# Patient Record
Sex: FEMALE | Race: WHITE | NOT HISPANIC OR LATINO | URBAN - METROPOLITAN AREA
[De-identification: names, ages, dates, MRNs, and addresses within clinical notes are randomized per-mention and may not be internally consistent; named-entity substitution may affect disease eponyms.]

---

## 2017-07-18 ENCOUNTER — APPOINTMENT (RX ONLY)
Dept: URBAN - METROPOLITAN AREA CLINIC 349 | Facility: CLINIC | Age: 52
Setting detail: DERMATOLOGY
End: 2017-07-18

## 2017-07-18 DIAGNOSIS — L81.4 OTHER MELANIN HYPERPIGMENTATION: ICD-10-CM

## 2017-07-18 DIAGNOSIS — D18.0 HEMANGIOMA: ICD-10-CM

## 2017-07-18 DIAGNOSIS — L70.0 ACNE VULGARIS: ICD-10-CM

## 2017-07-18 DIAGNOSIS — L82.1 OTHER SEBORRHEIC KERATOSIS: ICD-10-CM

## 2017-07-18 DIAGNOSIS — L98.8 OTHER SPECIFIED DISORDERS OF THE SKIN AND SUBCUTANEOUS TISSUE: ICD-10-CM

## 2017-07-18 DIAGNOSIS — D22 MELANOCYTIC NEVI: ICD-10-CM

## 2017-07-18 PROBLEM — D18.01 HEMANGIOMA OF SKIN AND SUBCUTANEOUS TISSUE: Status: ACTIVE | Noted: 2017-07-18

## 2017-07-18 PROBLEM — F32.9 MAJOR DEPRESSIVE DISORDER, SINGLE EPISODE, UNSPECIFIED: Status: ACTIVE | Noted: 2017-07-18

## 2017-07-18 PROBLEM — D22.5 MELANOCYTIC NEVI OF TRUNK: Status: ACTIVE | Noted: 2017-07-18

## 2017-07-18 PROBLEM — M12.9 ARTHROPATHY, UNSPECIFIED: Status: ACTIVE | Noted: 2017-07-18

## 2017-07-18 PROCEDURE — 99203 OFFICE O/P NEW LOW 30 MIN: CPT

## 2017-07-18 PROCEDURE — ? PRESCRIPTION

## 2017-07-18 PROCEDURE — ? COUNSELING

## 2017-07-18 PROCEDURE — ? RECOMMENDATIONS

## 2017-07-18 RX ORDER — DAPSONE 75 MG/G
GEL TOPICAL
Qty: 1 | Refills: 3 | Status: ERX | COMMUNITY
Start: 2017-07-18

## 2017-07-18 RX ORDER — SODIUM SULFACETAMIDE AND SULFUR 80; 40 MG/ML; MG/ML
LOTION TOPICAL
Qty: 1 | Refills: 4 | Status: ERX | COMMUNITY
Start: 2017-07-18

## 2017-07-18 RX ADMIN — DAPSONE: 75 GEL TOPICAL at 13:45

## 2017-07-18 RX ADMIN — SODIUM SULFACETAMIDE AND SULFUR: 80; 40 LOTION TOPICAL at 13:46

## 2017-07-18 ASSESSMENT — LOCATION DETAILED DESCRIPTION DERM
LOCATION DETAILED: INFERIOR THORACIC SPINE
LOCATION DETAILED: RIGHT INFERIOR CENTRAL MALAR CHEEK
LOCATION DETAILED: MIDDLE STERNUM
LOCATION DETAILED: RIGHT MEDIAL FRONTAL SCALP
LOCATION DETAILED: LEFT INFERIOR CENTRAL MALAR CHEEK
LOCATION DETAILED: LEFT MID-UPPER BACK
LOCATION DETAILED: LOWER STERNUM
LOCATION DETAILED: RIGHT MEDIAL SUPERIOR CHEST
LOCATION DETAILED: UPPER STERNUM
LOCATION DETAILED: RIGHT SUPERIOR MEDIAL UPPER BACK
LOCATION DETAILED: RIGHT LOWER CUTANEOUS LIP
LOCATION DETAILED: RIGHT SUPERIOR PARIETAL SCALP

## 2017-07-18 ASSESSMENT — LOCATION SIMPLE DESCRIPTION DERM
LOCATION SIMPLE: SCALP
LOCATION SIMPLE: RIGHT UPPER BACK
LOCATION SIMPLE: CHEST
LOCATION SIMPLE: LEFT UPPER BACK
LOCATION SIMPLE: RIGHT SCALP
LOCATION SIMPLE: RIGHT LIP
LOCATION SIMPLE: LEFT CHEEK
LOCATION SIMPLE: RIGHT CHEEK
LOCATION SIMPLE: UPPER BACK

## 2017-07-18 ASSESSMENT — LOCATION ZONE DERM
LOCATION ZONE: LIP
LOCATION ZONE: FACE
LOCATION ZONE: TRUNK
LOCATION ZONE: SCALP

## 2017-07-18 NOTE — HPI: SKIN LESIONS
How Severe Is Your Skin Lesion?: moderate
Have Your Skin Lesions Been Treated?: not been treated
Is This A New Presentation, Or A Follow-Up?: Skin Lesions
Additional History: Patient is particularly concerned about two lesions on her scalp, one has been present for 5 years and is growing and the other has been present for 6 months.

## 2017-07-18 NOTE — PROCEDURE: RECOMMENDATIONS
Recommendations (Free Text): Sunscreen
Recommendations (Free Text): Aczone 7.5% to a clean dry face nightly \\nSunscreen
Detail Level: Zone

## 2017-09-26 ENCOUNTER — APPOINTMENT (RX ONLY)
Dept: URBAN - METROPOLITAN AREA CLINIC 349 | Facility: CLINIC | Age: 52
Setting detail: DERMATOLOGY
End: 2017-09-26

## 2017-09-26 DIAGNOSIS — L70.0 ACNE VULGARIS: ICD-10-CM | Status: UNCHANGED

## 2017-09-26 PROBLEM — F41.9 ANXIETY DISORDER, UNSPECIFIED: Status: ACTIVE | Noted: 2017-09-26

## 2017-09-26 PROBLEM — K21.9 GASTRO-ESOPHAGEAL REFLUX DISEASE WITHOUT ESOPHAGITIS: Status: ACTIVE | Noted: 2017-09-26

## 2017-09-26 PROCEDURE — ? COUNSELING

## 2017-09-26 PROCEDURE — ? RECOMMENDATIONS

## 2017-09-26 PROCEDURE — 99213 OFFICE O/P EST LOW 20 MIN: CPT

## 2017-09-26 PROCEDURE — ? PRESCRIPTION

## 2017-09-26 RX ORDER — MINOCYCLINE HYDROCHLORIDE 100 MG/1
CAPSULE ORAL
Qty: 30 | Refills: 1 | Status: ERX | COMMUNITY
Start: 2017-09-26

## 2017-09-26 RX ORDER — SODIUM SULFACETAMIDE AND SULFUR 80; 40 MG/ML; MG/ML
LOTION TOPICAL
Qty: 1 | Refills: 3 | Status: ERX

## 2017-09-26 RX ADMIN — MINOCYCLINE HYDROCHLORIDE: 100 CAPSULE ORAL at 19:33

## 2017-09-26 ASSESSMENT — LOCATION SIMPLE DESCRIPTION DERM
LOCATION SIMPLE: LEFT CHEEK
LOCATION SIMPLE: RIGHT LIP
LOCATION SIMPLE: RIGHT CHEEK

## 2017-09-26 ASSESSMENT — LOCATION ZONE DERM
LOCATION ZONE: FACE
LOCATION ZONE: LIP

## 2017-09-26 ASSESSMENT — LOCATION DETAILED DESCRIPTION DERM
LOCATION DETAILED: RIGHT INFERIOR CENTRAL MALAR CHEEK
LOCATION DETAILED: RIGHT LOWER CUTANEOUS LIP
LOCATION DETAILED: LEFT INFERIOR CENTRAL MALAR CHEEK

## 2017-09-26 NOTE — PROCEDURE: RECOMMENDATIONS
Detail Level: Zone
Recommendations (Free Text): Patient states did not get sulfacleanse and did not call, info given for genrx, told to start and wash face twice daily \\nContinue Aczone 7.5% to a clean dry face nightly \\nMinocycline 100 mg once daily for 8 weeks \\nStressed not to pick

## 2017-10-18 ENCOUNTER — ANESTHESIA EVENT (OUTPATIENT)
Dept: SURGERY | Age: 52
End: 2017-10-18
Payer: COMMERCIAL

## 2017-10-19 ENCOUNTER — ANESTHESIA (OUTPATIENT)
Dept: SURGERY | Age: 52
End: 2017-10-19
Payer: COMMERCIAL

## 2017-10-19 ENCOUNTER — HOSPITAL ENCOUNTER (OUTPATIENT)
Age: 52
Setting detail: OUTPATIENT SURGERY
Discharge: HOME | End: 2017-10-19
Attending: ORTHOPAEDIC SURGERY | Admitting: ORTHOPAEDIC SURGERY
Payer: COMMERCIAL

## 2017-10-19 VITALS
HEART RATE: 84 BPM | SYSTOLIC BLOOD PRESSURE: 144 MMHG | BODY MASS INDEX: 27.44 KG/M2 | WEIGHT: 150 LBS | RESPIRATION RATE: 16 BRPM | DIASTOLIC BLOOD PRESSURE: 78 MMHG | OXYGEN SATURATION: 95 % | TEMPERATURE: 97.9 F

## 2017-10-19 PROCEDURE — 76210000021 HC REC RM PH II 0.5 TO 1 HR: Performed by: ORTHOPAEDIC SURGERY

## 2017-10-19 PROCEDURE — 76010000154 HC OR TIME FIRST 0.5 HR: Performed by: ORTHOPAEDIC SURGERY

## 2017-10-19 PROCEDURE — 77030002986 HC SUT PROL J&J -A: Performed by: ORTHOPAEDIC SURGERY

## 2017-10-19 PROCEDURE — 77030000032 HC CUF TRNQT ZIMM -B: Performed by: ORTHOPAEDIC SURGERY

## 2017-10-19 PROCEDURE — 74011250636 HC RX REV CODE- 250/636: Performed by: ANESTHESIOLOGY

## 2017-10-19 PROCEDURE — 74011250636 HC RX REV CODE- 250/636

## 2017-10-19 PROCEDURE — 74011000250 HC RX REV CODE- 250

## 2017-10-19 PROCEDURE — 74011000250 HC RX REV CODE- 250: Performed by: ORTHOPAEDIC SURGERY

## 2017-10-19 PROCEDURE — 74011250636 HC RX REV CODE- 250/636: Performed by: ORTHOPAEDIC SURGERY

## 2017-10-19 PROCEDURE — 76060000031 HC ANESTHESIA FIRST 0.5 HR: Performed by: ORTHOPAEDIC SURGERY

## 2017-10-19 PROCEDURE — 77030011884 HC TAPE CST PLSTR BSNM -A: Performed by: ORTHOPAEDIC SURGERY

## 2017-10-19 PROCEDURE — 77030010507 HC ADH SKN DERMBND J&J -B: Performed by: ORTHOPAEDIC SURGERY

## 2017-10-19 PROCEDURE — 77030006603 HC BLD CRPL ENDOSC S&N -B: Performed by: ORTHOPAEDIC SURGERY

## 2017-10-19 PROCEDURE — 77030018836 HC SOL IRR NACL ICUM -A: Performed by: ORTHOPAEDIC SURGERY

## 2017-10-19 RX ORDER — HYDROMORPHONE HYDROCHLORIDE 2 MG/ML
0.5 INJECTION, SOLUTION INTRAMUSCULAR; INTRAVENOUS; SUBCUTANEOUS
Status: DISCONTINUED | OUTPATIENT
Start: 2017-10-19 | End: 2017-10-19 | Stop reason: HOSPADM

## 2017-10-19 RX ORDER — MIDAZOLAM HYDROCHLORIDE 1 MG/ML
2 INJECTION, SOLUTION INTRAMUSCULAR; INTRAVENOUS
Status: DISCONTINUED | OUTPATIENT
Start: 2017-10-19 | End: 2017-10-19 | Stop reason: HOSPADM

## 2017-10-19 RX ORDER — LIDOCAINE HYDROCHLORIDE 10 MG/ML
INJECTION INFILTRATION; PERINEURAL AS NEEDED
Status: DISCONTINUED | OUTPATIENT
Start: 2017-10-19 | End: 2017-10-19 | Stop reason: HOSPADM

## 2017-10-19 RX ORDER — MIDAZOLAM HYDROCHLORIDE 1 MG/ML
2 INJECTION, SOLUTION INTRAMUSCULAR; INTRAVENOUS ONCE
Status: COMPLETED | OUTPATIENT
Start: 2017-10-19 | End: 2017-10-19

## 2017-10-19 RX ORDER — OXYCODONE HYDROCHLORIDE 5 MG/1
5 TABLET ORAL
Status: DISCONTINUED | OUTPATIENT
Start: 2017-10-19 | End: 2017-10-19 | Stop reason: HOSPADM

## 2017-10-19 RX ORDER — FENTANYL CITRATE 50 UG/ML
100 INJECTION, SOLUTION INTRAMUSCULAR; INTRAVENOUS ONCE
Status: DISCONTINUED | OUTPATIENT
Start: 2017-10-19 | End: 2017-10-19 | Stop reason: HOSPADM

## 2017-10-19 RX ORDER — CEFAZOLIN SODIUM IN 0.9 % NACL 2 G/50 ML
2 INTRAVENOUS SOLUTION, PIGGYBACK (ML) INTRAVENOUS ONCE
Status: COMPLETED | OUTPATIENT
Start: 2017-10-19 | End: 2017-10-19

## 2017-10-19 RX ORDER — LIDOCAINE HYDROCHLORIDE 10 MG/ML
0.1 INJECTION INFILTRATION; PERINEURAL AS NEEDED
Status: DISCONTINUED | OUTPATIENT
Start: 2017-10-19 | End: 2017-10-19 | Stop reason: HOSPADM

## 2017-10-19 RX ORDER — SODIUM CHLORIDE, SODIUM LACTATE, POTASSIUM CHLORIDE, CALCIUM CHLORIDE 600; 310; 30; 20 MG/100ML; MG/100ML; MG/100ML; MG/100ML
100 INJECTION, SOLUTION INTRAVENOUS CONTINUOUS
Status: DISCONTINUED | OUTPATIENT
Start: 2017-10-19 | End: 2017-10-19 | Stop reason: HOSPADM

## 2017-10-19 RX ORDER — FENTANYL CITRATE 50 UG/ML
INJECTION, SOLUTION INTRAMUSCULAR; INTRAVENOUS AS NEEDED
Status: DISCONTINUED | OUTPATIENT
Start: 2017-10-19 | End: 2017-10-19 | Stop reason: HOSPADM

## 2017-10-19 RX ORDER — PROPOFOL 10 MG/ML
INJECTION, EMULSION INTRAVENOUS AS NEEDED
Status: DISCONTINUED | OUTPATIENT
Start: 2017-10-19 | End: 2017-10-19 | Stop reason: HOSPADM

## 2017-10-19 RX ORDER — PROPOFOL 10 MG/ML
INJECTION, EMULSION INTRAVENOUS
Status: DISCONTINUED | OUTPATIENT
Start: 2017-10-19 | End: 2017-10-19 | Stop reason: HOSPADM

## 2017-10-19 RX ORDER — BUPIVACAINE HYDROCHLORIDE 5 MG/ML
INJECTION, SOLUTION EPIDURAL; INTRACAUDAL AS NEEDED
Status: DISCONTINUED | OUTPATIENT
Start: 2017-10-19 | End: 2017-10-19 | Stop reason: HOSPADM

## 2017-10-19 RX ORDER — LIDOCAINE HYDROCHLORIDE 20 MG/ML
INJECTION, SOLUTION EPIDURAL; INFILTRATION; INTRACAUDAL; PERINEURAL AS NEEDED
Status: DISCONTINUED | OUTPATIENT
Start: 2017-10-19 | End: 2017-10-19 | Stop reason: HOSPADM

## 2017-10-19 RX ADMIN — SODIUM CHLORIDE, SODIUM LACTATE, POTASSIUM CHLORIDE, AND CALCIUM CHLORIDE 100 ML/HR: 600; 310; 30; 20 INJECTION, SOLUTION INTRAVENOUS at 08:00

## 2017-10-19 RX ADMIN — PROPOFOL 75 MCG/KG/MIN: 10 INJECTION, EMULSION INTRAVENOUS at 08:42

## 2017-10-19 RX ADMIN — CEFAZOLIN 2 G: 1 INJECTION, POWDER, FOR SOLUTION INTRAMUSCULAR; INTRAVENOUS; PARENTERAL at 08:40

## 2017-10-19 RX ADMIN — PROPOFOL 80 MG: 10 INJECTION, EMULSION INTRAVENOUS at 08:42

## 2017-10-19 RX ADMIN — MIDAZOLAM HYDROCHLORIDE 2 MG: 1 INJECTION, SOLUTION INTRAMUSCULAR; INTRAVENOUS at 07:30

## 2017-10-19 RX ADMIN — LIDOCAINE HYDROCHLORIDE 20 MG: 20 INJECTION, SOLUTION EPIDURAL; INFILTRATION; INTRACAUDAL; PERINEURAL at 08:40

## 2017-10-19 RX ADMIN — FENTANYL CITRATE 25 MCG: 50 INJECTION, SOLUTION INTRAMUSCULAR; INTRAVENOUS at 08:50

## 2017-10-19 RX ADMIN — FENTANYL CITRATE 50 MCG: 50 INJECTION, SOLUTION INTRAMUSCULAR; INTRAVENOUS at 08:38

## 2017-10-19 RX ADMIN — FENTANYL CITRATE 25 MCG: 50 INJECTION, SOLUTION INTRAMUSCULAR; INTRAVENOUS at 08:45

## 2017-10-19 NOTE — OP NOTES
Carpal Tunnel Release Operative Report         Preoperative diagnosis:  Carpal tunnel syndrome, left [G56.02]    Postoperative diagnosis: LEFT CARPAL TUNNEL SYNDROME    Surgeon(s) and Role:     * Kelsea Lassiter MD - Primary     Anesthesia: MAC Local with MAC. Procedures: Procedure(s):  HAND CARPAL TUNNEL RELEASE ENDOSCOPIC LEFT     EBL/IV FLUIDS: Per Anesthesia. COMPLICATIONS: None. DISPOSITION: Stable to recovery room. INDICATIONS FOR PROCEDURE: The patient is a pleasant 54-year-old female with history of left carpal tunnel syndrome that has failed nonoperative measures. It was confirmed on preoperative nerve studies. After both operative and nonoperative treatment options were discussed, the decision was made to go ahead with a left endoscopic carpal tunnel release. Risks and benefits of the procedure were discussed including, but not limited to bleeding, infection, injury to adjacent structures consisting of tendon, artery, and nerve, need for additional procedures, wound dehiscence, scar formation, incomplete resolution of symptoms, recurrence of symptoms, and transient neuropraxia. Informed consent was obtained. PROCEDURE IN DETAIL: The patient was seen and marked in the preoperative suite. The patient was taken back to the OR, placed on the table in supine position with left upper extremities on hand tables. Left upper extremities were prepped and draped in standard sterile fashion. A formal timeout was performed confirming patient identification, preoperative antibiotics, and planned operative procedure. We infiltrated both planned incision sites with lidocaine and Marcaine, exsanguinated the left upper extremity and the tourniquet was placed up to 250 mmHg. A standard proximal incision was made just proximal to the distal palmar crease and ulnar to palmaris longus. Dissection was performed bluntly with Ragnell retractors.   The antebrachial fascia was identified and incised longitudinally. The carpal tunnel was entered with a spatula, staying ulnar throughout the procedure just radial to the hook of hamate. The undersurface of the trasverse carpal ligament was carefully scraped to remove adhesions. We placed the trocar in the same manner, ulnarly, made our distal incision and fully seated the trochar. We were able to see the entire transverse carpal ligament without difficulty. Under direct vision and in a proximal and distal manner, we incised the transverse carpal ligament ulnarly. We saw adequate retraction of leaflets and distal fat confirming complete release. Instruments were removed. We irrigated copiously with normal saline. The proximal incisionwas closed with Prolene and Dermabond glue. The distal incision was closed with Dermabond glue. The tourniquet was let down, the fingers had brisk capillary refill and a soft sterile dressing was placed. POSTOPERATIVE CARE: Early motion. No heavy lifting.  Followup in 2 weeks for suture removal.    Closure: Primary    Complications: None     Signed By: Burnard Pallas, MD

## 2017-10-19 NOTE — ANESTHESIA POSTPROCEDURE EVALUATION
Post-Anesthesia Evaluation and Assessment    Patient: Aggie Roman MRN: [id number]  SSN: [id number]    YOB: 1965  Age: 47 y.o. Sex: female       Cardiovascular Function/Vital Signs  Visit Vitals    /78    Pulse 84    Temp 36.6 °C (97.9 °F)    Resp 16    Wt 68 kg (150 lb)    SpO2 92%    BMI 27.44 kg/m2     Bedside sat 95%. Patient is status post total IV anesthesia anesthesia for Procedure(s):  HAND CARPAL TUNNEL RELEASE ENDOSCOPIC LEFT. Nausea/Vomiting: None    Postoperative hydration reviewed and adequate. Pain:      Managed    Neurological Status:   Neuro (WDL): Within Defined Limits (10/19/17 0724)   At baseline    Mental Status and Level of Consciousness: Awake. Pulmonary Status:   O2 Device: Room air (10/19/17 0900)   Adequate oxygenation and airway patent    Complications related to anesthesia: None    Post-anesthesia assessment completed.  No concerns    Signed By: Sarahy Burnett MD     October 19, 2017

## 2017-10-19 NOTE — IP AVS SNAPSHOT
[address] [facility] 
 
 
 [address] [facility] [address] [facility] 
[phone] Patient: Capri Torres MRN: [id number] :1965 You are allergic to the following No active allergies Recent Documentation Weight BMI OB Status Smoking Status 68 kg 27.44 kg/m2 Hysterectomy Former Smoker Emergency Contacts Name Discharge Info Relation Home Work Mobile Victor Hugo Tenorio DISCHARGE CAREGIVER [3] Spouse [3] [phone] About your hospitalization You were admitted on:  2017 You last received care in the:  [facility] OP PACU You were discharged on:  2017 Unit phone number:  [phone] Why you were hospitalized Your primary diagnosis was:  Not on File Providers Seen During Your Hospitalizations Provider Role Specialty Primary office phone Jacqueline Gonsales MD Attending Provider Orthopedic Surgery [phone] Your Primary Care Physician (PCP) Primary Care Physician Office Phone Office Fax [facility], [address] [place] [address] [phone] Follow-up Information Follow up With Details Comments Contact Info Melo Abdalla MD   [address] [facility] [address],[address] 
[phone] Your Appointments   2:30 PM EDT Office Visit with Melo Abdalla MD  
[address]) [address] [address] [phone] Current Discharge Medication List  
  
CONTINUE these medications which have CHANGED Dose & Instructions Dispensing Information Comments Morning Noon Evening Bedtime  
 busPIRone 15 mg tablet Commonly known as:  BUSPAR What changed:   
- how much to take - when to take this 
- additional instructions Your last dose was: Your next dose is: TAKE 2 TABLETS DAILY Quantity:  60 Tab Refills:  0 DULoxetine 60 mg capsule Commonly known as:  CYMBALTA What changed:   
- how much to take - when to take this 
- additional instructions Your last dose was: Your next dose is: TAKE ONE CAPSULE DAILY Quantity:  30 Cap Refills:  0 CONTINUE these medications which have NOT CHANGED Dose & Instructions Dispensing Information Comments Morning Noon Evening Bedtime B COMPLEX 1 tablet Generic drug:  b complex vitamins Your last dose was: Your next dose is:    
   
   
 Dose:  1 Tab Take 1 Tab by mouth daily. Refills:  0 LORazepam 1 mg tablet Commonly known as:  ATIVAN Your last dose was: Your next dose is:    
   
   
 Dose:  1 mg Take 1 Tab by mouth two (2) times daily as needed for Anxiety. Quantity:  60 Tab Refills:  5  
     
   
   
   
  
 meloxicam 15 mg tablet Commonly known as:  MOBIC Your last dose was: Your next dose is:    
   
   
 Dose:  15 mg Take 1 Tab by mouth daily. Quantity:  90 Tab Refills:  0 Discharge Instructions Keep dressing clean, dry and intact until post op day number 2-3. Then may shower, pat dry and keep covered until follow up. Do not scrub incision or submerge under water. Move fingers, elevate, and ice to prevent swelling. No heavy lifting. ACTIVITY · As tolerated and as directed by your doctor. · Move fingers, elevate, and ice to prevent swelling. No heavy lifting. Wrap in plastic when bathing for 2 to 3 days. then may shower, pat dry and keep covered until follow up. Do not scrub incision or submerge under water DIET · Clear liquids until no nausea or vomiting; then light diet for the first day. · Advance to regular diet on second day, unless your doctor orders otherwise. · If nausea and vomiting continues, call your doctor. · Avoid greasy and spicy food today to reduce nausea. PAIN 
· Take pain medication as directed by your doctor. · Call your doctor if pain is NOT relieved by medication. · DO NOT take aspirin of blood thinners unless directed by your doctor. · Take pain pills with food to reduce nausea · Pain pills may cause constipation. May use stool softener DRESSING CARE Keep dressing clean, dry and intact until post op day number 2-3. Then may shower, pat dry and keep covered until follow up. Do not scrub incision or submerge under water. CALL YOUR DOCTOR IF  
· Excessive bleeding that does not stop after holding pressure over the area · Temperature of 101 degrees F or above · Excessive redness, swelling or bruising, and/ or green or yellow, smelly discharge from incision AFTER ANESTHESIA · For the first 24 hours: DO NOT Drive, Drink alcoholic beverages, or Make important decisions. · Be aware of dizziness following anesthesia and while taking pain medication. APPOINTMENT DATE/ TIME: November 1,2017 at 9:35 a.m. At [place] [address] office YOUR DOCTOR'S PHONE NUMBER [phone] DISCHARGE SUMMARY from Nurse PATIENT INSTRUCTIONS: 
 
After general anesthesia or intravenous sedation, for 24 hours or while taking prescription Narcotics: · Limit your activities · Do not drive and operate hazardous machinery · Do not make important personal or business decisions · Do  not drink alcoholic beverages · If you have not urinated within 8 hours after discharge, please contact your surgeon on call. *  Please give a list of your current medications to your Primary Care Provider. *  Please update this list whenever your medications are discontinued, doses are 
    changed, or new medications (including over-the-counter products) are added. *  Please carry medication information at all times in case of emergency situations. These are general instructions for a healthy lifestyle: No smoking/ No tobacco products/ Avoid exposure to second hand smoke Surgeon General's Warning:  Quitting smoking now greatly reduces serious risk to your health. Obesity, smoking, and sedentary lifestyle greatly increases your risk for illness A healthy diet, regular physical exercise & weight monitoring are important for maintaining a healthy lifestyle You may be retaining fluid if you have a history of heart failure or if you experience any of the following symptoms:  Weight gain of 3 pounds or more overnight or 5 pounds in a week, increased swelling in our hands or feet or shortness of breath while lying flat in bed. Please call your doctor as soon as you notice any of these symptoms; do not wait until your next office visit. Recognize signs and symptoms of STROKE: 
 
F-face looks uneven A-arms unable to move or move unevenly S-speech slurred or non-existent T-time-call 911 as soon as signs and symptoms begin-DO NOT go Back to bed or wait to see if you get better-TIME IS BRAIN. Discharge Orders None Introducing [facility]! Carmella Vaughan introduces Free[employer]M patient portal. Now you can access parts of your medical record, email your doctor's office, and request medication refills online. 1. In your internet browser, go to [email or web address] 2. Click on the First Time User? Click Here link in the Sign In box. You will see the New Member Sign Up page. 3. Enter your Free[employer]M Access Code exactly as it appears below. You will not need to use this code after youve completed the sign-up process. If you do not sign up before the expiration date, you must request a new code. · Free[employer] Access Code: [email or web address] Expires: 1/16/2018  1:09 PM 
 
4. Enter the last four digits of your Social Security Number (xxxx) and Date of Birth (mm/dd/yyyy) as indicated and click Submit. You will be taken to the next sign-up page. 5. Create a [employer] ID. This will be your [employer] login ID and cannot be changed, so think of one that is secure and easy to remember. 6. Create a [employer] password. You can change your password at any time. 7. Enter your Password Reset Question and Answer. This can be used at a later time if you forget your password. 8. Enter your e-mail address. You will receive e-mail notification when new information is available in [address] 9. Click Sign Up. You can now view and download portions of your medical record. 10. Click the Download Summary menu link to download a portable copy of your medical information. If you have questions, please visit the Frequently Asked Questions section of the [employer] website. Remember, [employer] is NOT to be used for urgent needs. For medical emergencies, dial 911. Now available from your iPhone and Android! General Information Please provide this summary of care documentation to your next provider. Patient Signature:  ____________________________________________________________ Date:  ____________________________________________________________  
  
Trina Holt Provider Signature:  ____________________________________________________________ Date:  ____________________________________________________________

## 2017-10-19 NOTE — DISCHARGE INSTRUCTIONS
Keep dressing clean, dry and intact until post op day number 2-3. Then may shower, pat dry and keep covered until follow up. Do not scrub incision or submerge under water. Move fingers, elevate, and ice to prevent swelling. No heavy lifting. ACTIVITY  · As tolerated and as directed by your doctor. · Move fingers, elevate, and ice to prevent swelling. No heavy lifting. Wrap in plastic when bathing for 2 to 3 days. then may shower, pat dry and keep covered until follow up. Do not scrub incision or submerge under water   DIET  · Clear liquids until no nausea or vomiting; then light diet for the first day. · Advance to regular diet on second day, unless your doctor orders otherwise. · If nausea and vomiting continues, call your doctor. · Avoid greasy and spicy food today to reduce nausea. PAIN  · Take pain medication as directed by your doctor. · Call your doctor if pain is NOT relieved by medication. · DO NOT take aspirin of blood thinners unless directed by your doctor. · Take pain pills with food to reduce nausea  · Pain pills may cause constipation. May use stool softener    DRESSING CARE Keep dressing clean, dry and intact until post op day number 2-3. Then may shower, pat dry and keep covered until follow up. Do not scrub incision or submerge under water. CALL YOUR DOCTOR IF   · Excessive bleeding that does not stop after holding pressure over the area  · Temperature of 101 degrees F or above  · Excessive redness, swelling or bruising, and/ or green or yellow, smelly discharge from incision    AFTER ANESTHESIA   · For the first 24 hours: DO NOT Drive, Drink alcoholic beverages, or Make important decisions. · Be aware of dizziness following anesthesia and while taking pain medication. APPOINTMENT DATE/ TIME: November 1,2017 at 9:35 a.m.  At [place] [address] office     Anamaria Velez DOCTOR'S PHONE NUMBER [phone]      DISCHARGE SUMMARY from Nurse    PATIENT INSTRUCTIONS:    After general anesthesia or intravenous sedation, for 24 hours or while taking prescription Narcotics:  · Limit your activities  · Do not drive and operate hazardous machinery  · Do not make important personal or business decisions  · Do  not drink alcoholic beverages  · If you have not urinated within 8 hours after discharge, please contact your surgeon on call. *  Please give a list of your current medications to your Primary Care Provider. *  Please update this list whenever your medications are discontinued, doses are      changed, or new medications (including over-the-counter products) are added. *  Please carry medication information at all times in case of emergency situations. These are general instructions for a healthy lifestyle:    No smoking/ No tobacco products/ Avoid exposure to second hand smoke    Surgeon General's Warning:  Quitting smoking now greatly reduces serious risk to your health. Obesity, smoking, and sedentary lifestyle greatly increases your risk for illness    A healthy diet, regular physical exercise & weight monitoring are important for maintaining a healthy lifestyle    You may be retaining fluid if you have a history of heart failure or if you experience any of the following symptoms:  Weight gain of 3 pounds or more overnight or 5 pounds in a week, increased swelling in our hands or feet or shortness of breath while lying flat in bed. Please call your doctor as soon as you notice any of these symptoms; do not wait until your next office visit. Recognize signs and symptoms of STROKE:    F-face looks uneven    A-arms unable to move or move unevenly    S-speech slurred or non-existent    T-time-call 911 as soon as signs and symptoms begin-DO NOT go       Back to bed or wait to see if you get better-TIME IS BRAIN.

## 2017-10-19 NOTE — IP AVS SNAPSHOT
[facility] 
 
 
 [address] [facility] [address] [facility] 
[phone] Patient: Abdulaziz Molina MRN: [id number] :1965 Current Discharge Medication List  
  
CONTINUE these medications which have CHANGED Dose & Instructions Dispensing Information Comments Morning Noon Evening Bedtime  
 busPIRone 15 mg tablet Commonly known as:  BUSPAR What changed:   
- how much to take - when to take this 
- additional instructions Your last dose was: Your next dose is: TAKE 2 TABLETS DAILY Quantity:  60 Tab Refills:  0 DULoxetine 60 mg capsule Commonly known as:  CYMBALTA What changed:   
- how much to take - when to take this 
- additional instructions Your last dose was: Your next dose is: TAKE ONE CAPSULE DAILY Quantity:  30 Cap Refills:  0 CONTINUE these medications which have NOT CHANGED Dose & Instructions Dispensing Information Comments Morning Noon Evening Bedtime B COMPLEX 1 tablet Generic drug:  b complex vitamins Your last dose was: Your next dose is:    
   
   
 Dose:  1 Tab Take 1 Tab by mouth daily. Refills:  0 LORazepam 1 mg tablet Commonly known as:  ATIVAN Your last dose was: Your next dose is:    
   
   
 Dose:  1 mg Take 1 Tab by mouth two (2) times daily as needed for Anxiety. Quantity:  60 Tab Refills:  5  
     
   
   
   
  
 meloxicam 15 mg tablet Commonly known as:  MOBIC Your last dose was: Your next dose is:    
   
   
 Dose:  15 mg Take 1 Tab by mouth daily. Quantity:  90 Tab Refills:  0

## 2017-10-19 NOTE — ANESTHESIA PREPROCEDURE EVALUATION
Anesthetic History   No history of anesthetic complications            Review of Systems / Medical History  Pertinent labs reviewed    Pulmonary          Smoker         Neuro/Psych         Psychiatric history     Cardiovascular  Within defined limits                Exercise tolerance: >4 METS     GI/Hepatic/Renal  Within defined limits              Endo/Other        Arthritis     Other Findings            Physical Exam    Airway  Mallampati: II  TM Distance: 4 - 6 cm  Neck ROM: normal range of motion   Mouth opening: Normal     Cardiovascular  Regular rate and rhythm,  S1 and S2 normal,  no murmur, click, rub, or gallop             Dental  No notable dental hx       Pulmonary  Breath sounds clear to auscultation               Abdominal  GI exam deferred       Other Findings            Anesthetic Plan    ASA: 2  Anesthesia type: total IV anesthesia          Induction: Intravenous  Anesthetic plan and risks discussed with: Patient and Spouse

## 2018-01-18 ENCOUNTER — HOSPITAL ENCOUNTER (OUTPATIENT)
Dept: MAMMOGRAPHY | Age: 53
Discharge: HOME | End: 2018-01-18
Attending: INTERNAL MEDICINE
Payer: COMMERCIAL

## 2018-01-18 DIAGNOSIS — Z12.39 SCREENING FOR BREAST CANCER: ICD-10-CM

## 2018-01-18 PROCEDURE — 77067 SCR MAMMO BI INCL CAD: CPT

## 2018-06-06 ENCOUNTER — HOSPITAL ENCOUNTER (OUTPATIENT)
Dept: SURGERY | Age: 53
Discharge: HOME | End: 2018-06-06

## 2018-06-06 VITALS — WEIGHT: 150 LBS | HEIGHT: 62 IN | BODY MASS INDEX: 27.6 KG/M2

## 2018-06-06 RX ORDER — OMEPRAZOLE 40 MG/1
40 CAPSULE, DELAYED RELEASE ORAL DAILY
COMMUNITY

## 2018-06-06 RX ORDER — DEXTROMETHORPHAN HYDROBROMIDE, GUAIFENESIN 5; 100 MG/5ML; MG/5ML
650 LIQUID ORAL AS NEEDED
COMMUNITY

## 2018-06-06 NOTE — PERIOP NOTES
Patient verified name, , and procedure. Type: 1a; abbreviated assessment per anesthesia guidelines  Labs per surgeon: none  Labs per anesthesia: none    Instructed pt that they will be notified via preop for time of arrival to GI lab. Follow diet and prep instructions per Dr Tracey Way instructions as follows: You will be on a clear liquid diet the day before your procedure and you may have any of the following clear liquids:   Water.  Strained fruit juices, without pulp, including apple, orange, white grape, or white cranberry.  Limeade or lemonade.  Coffee or tea (do not use any non-dairy creamer.)   Chicken broth.  Gelatin desserts, without added fruit or toppings (no red or purple gelatin.)  You should NOT:   Do NOT drink any milk products or use any milk products in coffee or tea.  Do NOT drink anything with red or purple dye.  Do NOT drink any alcoholic beverages. Bath or shower the night before and the am of surgery with non-moisturizing soap. No lotions, oils, powders, cologne on skin. No make up, eye make up or jewelry. Wear loose fitting comfortable, clean clothing. Must have adult present in building the entire time and MUST bring someone with you or arrange for someone to drive you home after the test.      You may take medications up to 3 hours prior to your procedure with sips of water only. Medications to take- tylenol, buspar, cymbalta, ativan, prilosec; patient to hold- mobic, vitamins. The following discharge instructions reviewed with patient: medication given during procedure may cause drowsiness for several hours, therefore, do not drive or operate machinery for remainder of the day, no alcohol on the day of your procedure, resume regular diet and activity unless otherwise directed, you may experience abdominal distention for several hours that is relieved by the passage of gas.  Contact your physician if you have any of the following: fever or chills, severe abdominal pain or excessive amount of bleeding or a large amount when having a bowel movement.  Occasional specks of blood with bowel movement would not be unusual.

## 2018-06-08 ENCOUNTER — HOSPITAL ENCOUNTER (OUTPATIENT)
Dept: CT IMAGING | Age: 53
Discharge: HOME | End: 2018-06-08
Attending: INTERNAL MEDICINE
Payer: SELF-PAY

## 2018-06-08 DIAGNOSIS — Z91.89 CARDIOVASCULAR RISK FACTOR: ICD-10-CM

## 2018-06-08 PROCEDURE — 75571 CT HRT W/O DYE W/CA TEST: CPT

## 2018-06-11 ENCOUNTER — ANESTHESIA EVENT (OUTPATIENT)
Dept: ENDOSCOPY | Age: 53
End: 2018-06-11
Payer: COMMERCIAL

## 2018-06-11 RX ORDER — SODIUM CHLORIDE 0.9 % (FLUSH) 0.9 %
5-10 SYRINGE (ML) INJECTION AS NEEDED
Status: CANCELLED | OUTPATIENT
Start: 2018-06-11

## 2018-06-11 RX ORDER — SODIUM CHLORIDE, SODIUM LACTATE, POTASSIUM CHLORIDE, CALCIUM CHLORIDE 600; 310; 30; 20 MG/100ML; MG/100ML; MG/100ML; MG/100ML
100 INJECTION, SOLUTION INTRAVENOUS CONTINUOUS
Status: CANCELLED | OUTPATIENT
Start: 2018-06-11

## 2018-06-11 NOTE — PROGRESS NOTES
Patient notified per Dr. Juliane Vargas that her Coronary calcium score is 5-congratulations. No concerns.

## 2018-06-12 ENCOUNTER — ANESTHESIA (OUTPATIENT)
Dept: ENDOSCOPY | Age: 53
End: 2018-06-12
Payer: COMMERCIAL

## 2018-06-12 ENCOUNTER — HOSPITAL ENCOUNTER (OUTPATIENT)
Age: 53
Setting detail: OUTPATIENT SURGERY
Discharge: HOME | End: 2018-06-12
Attending: SURGERY | Admitting: SURGERY
Payer: COMMERCIAL

## 2018-06-12 VITALS
WEIGHT: 154 LBS | OXYGEN SATURATION: 99 % | HEART RATE: 75 BPM | BODY MASS INDEX: 28.34 KG/M2 | RESPIRATION RATE: 12 BRPM | DIASTOLIC BLOOD PRESSURE: 78 MMHG | SYSTOLIC BLOOD PRESSURE: 122 MMHG | HEIGHT: 62 IN | TEMPERATURE: 98 F

## 2018-06-12 PROBLEM — Z80.0 FAMILY HISTORY OF COLON CANCER: Status: ACTIVE | Noted: 2018-06-12

## 2018-06-12 PROCEDURE — 74011250636 HC RX REV CODE- 250/636: Performed by: ANESTHESIOLOGY

## 2018-06-12 PROCEDURE — 76040000025: Performed by: SURGERY

## 2018-06-12 PROCEDURE — 74011250636 HC RX REV CODE- 250/636

## 2018-06-12 PROCEDURE — 76060000032 HC ANESTHESIA 0.5 TO 1 HR: Performed by: SURGERY

## 2018-06-12 PROCEDURE — 74011000250 HC RX REV CODE- 250

## 2018-06-12 RX ORDER — LIDOCAINE HYDROCHLORIDE 20 MG/ML
INJECTION, SOLUTION EPIDURAL; INFILTRATION; INTRACAUDAL; PERINEURAL AS NEEDED
Status: DISCONTINUED | OUTPATIENT
Start: 2018-06-12 | End: 2018-06-12 | Stop reason: HOSPADM

## 2018-06-12 RX ORDER — PROPOFOL 10 MG/ML
INJECTION, EMULSION INTRAVENOUS
Status: DISCONTINUED | OUTPATIENT
Start: 2018-06-12 | End: 2018-06-12 | Stop reason: HOSPADM

## 2018-06-12 RX ORDER — SODIUM CHLORIDE, SODIUM LACTATE, POTASSIUM CHLORIDE, CALCIUM CHLORIDE 600; 310; 30; 20 MG/100ML; MG/100ML; MG/100ML; MG/100ML
100 INJECTION, SOLUTION INTRAVENOUS CONTINUOUS
Status: DISCONTINUED | OUTPATIENT
Start: 2018-06-12 | End: 2018-06-12 | Stop reason: HOSPADM

## 2018-06-12 RX ADMIN — SODIUM CHLORIDE, SODIUM LACTATE, POTASSIUM CHLORIDE, AND CALCIUM CHLORIDE: 600; 310; 30; 20 INJECTION, SOLUTION INTRAVENOUS at 12:09

## 2018-06-12 RX ADMIN — PROPOFOL 120 MCG/KG/MIN: 10 INJECTION, EMULSION INTRAVENOUS at 12:15

## 2018-06-12 RX ADMIN — LIDOCAINE HYDROCHLORIDE 40 MG: 20 INJECTION, SOLUTION EPIDURAL; INFILTRATION; INTRACAUDAL; PERINEURAL at 12:11

## 2018-06-12 NOTE — H&P
Hina Teonrio    6/12/2018    Date of Admission:  6/12/2018      Subjective:     Patient is a 46 y.o.  female presents for screening colonoscopy. The patient reports no problems. The patient has family history of colon cancer. Her mother was diagnosed with colon cancer at the age of 61. The patient has had a colonoscopy in the past. Her last colonoscopy was in 2009 and was normal. Otherwise there is no reported rectal bleeding or melena. No changes in appetite or unusual wt loss. No abdominal pain or bloating. No reported changes in bowel habits. Patient Active Problem List    Diagnosis Date Noted    Family history of colon cancer 06/12/2018    Anxiety     Chronic insomnia     History of tobacco use     Osteoarthritis of hands, bilateral     Cervical stenosis of spinal canal 04/06/2016     Past Medical History:   Diagnosis Date    Anxiety     Chronic insomnia     GERD (gastroesophageal reflux disease)     managed with medication    History of tobacco use     Osteoarthritis of hands, bilateral       Past Surgical History:   Procedure Laterality Date    HX CARPAL TUNNEL RELEASE Left 10/2017    HX CERVICAL DISKECTOMY  04/2016    Anterior cervical diskectomy  C5 - C7     HX CHOLECYSTECTOMY      HX COLONOSCOPY      HX ISABEL AND BSO        Prior to Admission Medications   Prescriptions Last Dose Informant Patient Reported? Taking? DULoxetine (CYMBALTA) 60 mg capsule   No Yes   Sig: TAKE ONE CAPSULE DAILY   DULoxetine (CYMBALTA) 60 mg capsule 6/12/2018 at 0900  No Yes   Sig: TAKE ONE CAPSULE BY MOUTH EVERY DAY   LORazepam (ATIVAN) 1 mg tablet 6/12/2018 at 0900  No Yes   Sig: Take 1 Tab by mouth two (2) times daily as needed for Anxiety. acetaminophen (TYLENOL ARTHRITIS PAIN) 650 mg TbER 6/11/2018 at Unknown time  Yes Yes   Sig: Take 650 mg by mouth as needed. b complex vitamins (B COMPLEX 1) tablet 6/5/2018 at Unknown time  Yes Yes   Sig: Take 1 Tab by mouth daily.    busPIRone (BUSPAR) 15 mg tablet 6/12/2018 at 0900  No Yes   Sig: TAKE 2 TABLETS DAILY   meloxicam (MOBIC) 15 mg tablet 6/5/2018 at Unknown time  No Yes   Sig: Take 1 Tab by mouth daily as needed for Pain. omeprazole (PRILOSEC) 40 mg capsule 6/12/2018 at 0900  Yes Yes   Sig: Take 40 mg by mouth daily. Facility-Administered Medications: None     No Known Allergies   Social History   Substance Use Topics    Smoking status: Former Smoker     Packs/day: 0.50     Years: 10.00     Types: Cigarettes     Quit date: 4/12/2016    Smokeless tobacco: Never Used    Alcohol use No      Social History     Social History Narrative     Family History   Problem Relation Age of Onset    Cancer Mother     Cancer Father     Breast Cancer Neg Hx         Current Facility-Administered Medications   Medication Dose Route Frequency    lactated Ringers infusion  100 mL/hr IntraVENous CONTINUOUS       Review of Systems  A comprehensive review of systems was negative except for that written in the HPI.     Objective:     Vitals:    06/12/18 1129   BP: 140/80   Pulse: 64   Resp: 16   Temp: 97.9 °F (36.6 °C)   SpO2: 97%   Weight: 154 lb (69.9 kg)   Height: 5' 2\" (1.575 m)     PHYSICAL EXAM   Physical Examination: General appearance - alert, well appearing, and in no distress  Mental status - alert, oriented to person, place, and time  Eyes - pupils equal and reactive, extraocular eye movements intact  Nose - normal and patent, no erythema, discharge or polyps  Neck - supple, no significant adenopathy  Chest - clear to auscultation, no wheezes, rales or rhonchi, symmetric air entry  Heart - normal rate, regular rhythm, normal S1, S2, no murmurs, rubs, clicks or gallops  Abdomen - soft, nontender, nondistended, no masses or organomegaly  Neurological - alert, oriented, normal speech, no focal findings or movement disorder noted  Musculoskeletal - no joint tenderness, deformity or swelling  Extremities - peripheral pulses normal, no pedal edema, no clubbing or cyanosis  Skin - normal coloration and turgor, no rashes, no suspicious skin lesions noted      No results for input(s): WBC, HGB, HCT, PLT, HGBEXT, HCTEXT, PLTEXT in the last 72 hours. No results for input(s): NA, K, CL, GLU, CO2, BUN, CREA, MG, PHOS, TROIQ, INR, BNPP in the last 72 hours. No lab exists for component: TROIP, INREXT  No results for input(s): PH, PCO2, PO2, HCO3 in the last 72 hours.     Assessment:     Hospital Problems  Date Reviewed: 6/12/2018          Codes Class Noted POA    * (Principal)Family history of colon cancer ICD-10-CM: Z80.0  ICD-9-CM: V16.0  6/12/2018 Unknown            Plan:   Screening colonoscopy      Tk Owusu DO

## 2018-06-12 NOTE — DISCHARGE INSTRUCTIONS
Gastrointestinal Colonoscopy/Flexible Sigmoidoscopy - Lower Exam Discharge Instructions  1. Call Dr. Vikki Contreras at office for any problems or questions. 2. Contact the doctors office for follow up appointment as directed  3. Medication may cause drowsiness for several hours, therefore, do not drive or operate machinery for remainder of the day. 4. No alcohol today. 5. Ordinarily, you may resume regular diet and activity after exam unless otherwise specified by your physician. 6. Because of air put into your colon during exam, you may experience some abdominal distension, relieved by the passage of gas, for several hours. 7. Contact your physician if you have any of the following:  a. Excessive amount of bleeding - large amount when having a bowel movement. Occasional specks of blood with bowel movement would not be unusual.  b. Severe abdominal pain  c. Fever or Chills  8. Polyp Removal - follow these additional instructions  a. Clear liquid diet for the next meal (jell-o, broth, clear drinks)  b. Soft diet for 24 hours, then resume regular diet   c. Take Metamucil - 1 tablespoon in juice every morning for 3 days  d. No Aspirin, Advil, Aleve, Nuprin, Ibuprofen, or medications that contain these drugs for 2 weeks. Any additional instructions:  ***      Instructions given to Hina Coby and other family members.   Instructions given by:  Ankur Sparks RN

## 2018-06-12 NOTE — IP AVS SNAPSHOT
[address] [facility] [id number] 
[phone] Patient: Cassi Shannon MRN: [id number] :1965 About your hospitalization You were admitted on:  2018 You last received care in the:  [facility] PACU You were discharged on:  2018 Why you were hospitalized Your primary diagnosis was:  Family History Of Colon Cancer Follow-up Information Follow up With Details Comments Contact Info Jacey Owusu,    [address] [facility] [address] [facility] [address] 
[phone] Discharge Orders None A check gerardo indicates which time of day the medication should be taken. My Medications ASK your doctor about these medications Instructions Each Dose to Equal  
 Morning Noon Evening Bedtime B COMPLEX 1 tablet Generic drug:  b complex vitamins Your last dose was: Your next dose is: Take 1 Tab by mouth daily. 1 Tab  
    
   
   
   
  
 busPIRone 15 mg tablet Commonly known as:  BUSPAR Your last dose was: Your next dose is: TAKE 2 TABLETS DAILY * DULoxetine 60 mg capsule Commonly known as:  CYMBALTA Your last dose was: Your next dose is: TAKE ONE CAPSULE DAILY * DULoxetine 60 mg capsule Commonly known as:  CYMBALTA Your last dose was: Your next dose is: TAKE ONE CAPSULE BY MOUTH EVERY DAY  
     
   
   
   
  
 LORazepam 1 mg tablet Commonly known as:  ATIVAN Your last dose was: Your next dose is: Take 1 Tab by mouth two (2) times daily as needed for Anxiety. 1 mg  
    
   
   
   
  
 meloxicam 15 mg tablet Commonly known as:  MOBIC Your last dose was: Your next dose is: Take 1 Tab by mouth daily as needed for Pain.   
 15 mg  
    
   
   
   
  
 PriLOSEC 40 mg capsule Generic drug:  omeprazole Your last dose was: Your next dose is: Take 40 mg by mouth daily. 40 mg  
    
   
   
   
  
 TYLENOL ARTHRITIS PAIN 650 mg Tber Generic drug:  acetaminophen Your last dose was: Your next dose is: Take 650 mg by mouth as needed. 650 mg  
    
   
   
   
  
 * Notice: This list has 2 medication(s) that are the same as other medications prescribed for you. Read the directions carefully, and ask your doctor or other care provider to review them with you. Discharge Instructions Gastrointestinal Colonoscopy/Flexible Sigmoidoscopy - Lower Exam Discharge Instructions 1. Call Dr. Norman Lofton at office for any problems or questions. 2. Contact the doctors office for follow up appointment as directed 3. Medication may cause drowsiness for several hours, therefore, do not drive or operate machinery for remainder of the day. 4. No alcohol today. 5. Ordinarily, you may resume regular diet and activity after exam unless otherwise specified by your physician. 6. Because of air put into your colon during exam, you may experience some abdominal distension, relieved by the passage of gas, for several hours. 7. Contact your physician if you have any of the following: 
a. Excessive amount of bleeding  large amount when having a bowel movement. Occasional specks of blood with bowel movement would not be unusual. 
b. Severe abdominal pain 
c. Fever or Chills 8. Polyp Removal  follow these additional instructions 
a. Clear liquid diet for the next meal (jell-o, broth, clear drinks) b. Soft diet for 24 hours, then resume regular diet  
c. Take Metamucil  1 tablespoon in juice every morning for 3 days 
d. No Aspirin, Advil, Aleve, Nuprin, Ibuprofen, or medications that contain these drugs for 2 weeks.  
Any additional instructions:  *** 
 
 
 Instructions given to Hina Tenorio and other family members. Instructions given by:  Shereen Simmonds, RN Introducing [facility]! Stacyreyes Dinorah introduces [employer] patient portal. Now you can access parts of your medical record, email your doctor's office, and request medication refills online. 1. In your internet browser, go to [email or web address] [employer] 2. Click on the First Time User? Click Here link in the Sign In box. You will see the New Member Sign Up page. 3. Enter your [employer] Access Code exactly as it appears below. You will not need to use this code after youve completed the sign-up process. If you do not sign up before the expiration date, you must request a new code. · [employer] Access Code: [email or web address] Expires: 7/31/2018  3:57 PM 
 
4. Enter the last four digits of your Social Security Number (xxxx) and Date of Birth (mm/dd/yyyy) as indicated and click Submit. You will be taken to the next sign-up page. 5. Create a [employer] ID. This will be your [employer] login ID and cannot be changed, so think of one that is secure and easy to remember. 6. Create a [employer] password. You can change your password at any time. 7. Enter your Password Reset Question and Answer. This can be used at a later time if you forget your password. 8. Enter your e-mail address. You will receive e-mail notification when new information is available in [address] 9. Click Sign Up. You can now view and download portions of your medical record. 10. Click the Download Summary menu link to download a portable copy of your medical information. If you have questions, please visit the Frequently Asked Questions section of the [employer] website. Remember, [employer] is NOT to be used for urgent needs. For medical emergencies, dial 911. Now available from your iPhone and Android! Introducing Tommy Heaton As a [facility] patient, I wanted to make you aware of our electronic visit tool called Tommy LucioAzonia. [facility] 24/7 allows you to connect within minutes with a medical provider 24 hours a day, seven days a week via a mobile device or tablet or logging into a secure website from your computer. You can access [facility] from anywhere in the [place]. A virtual visit might be right for you when you have a simple condition and feel like you just dont want to get out of bed, or cant get away from work for an appointment, when your regular [facility] provider is not available (evenings, weekends or holidays), or when youre out of town and need minor care. Electronic visits cost only $49 and if the [facility] 24/7 provider determines a prescription is needed to treat your condition, one can be electronically transmitted to a nearby pharmacy*. Please take a moment to enroll today if you have not already done so. The enrollment process is free and takes just a few minutes. To enroll, please download the [facility] 24/7 zuleika to your tablet or phone, or visit [email or web address] to enroll on your computer. And, as an [address] [place] [address] patient with a [employer] account, the results of your visits will be scanned into your electronic medical record and your primary care provider will be able to view the scanned results. We urge you to continue to see your regular [facility] provider for your ongoing medical care. And while your primary care provider may not be the one available when you seek a Tommy Heaton virtual visit, the peace of mind you get from getting a real diagnosis real time can be priceless. For more information on Tommy [employer]ethelfin, view our Frequently Asked Questions (FAQs) at [email or web address]. Sincerely, 
 
Gil Walton MD 
Chief Medical Officer [facility] *:  certain medications cannot be prescribed via Tommy Heaton Providers Seen During Your Hospitalization Provider Specialty Primary office phone Kike Daniel, [facility] [phone] Your Primary Care Physician (PCP) Primary Care Physician Office Phone Office Fax Fernando Barr, [address] [facility] [phone] You are allergic to the following No active allergies Recent Documentation Height Weight BMI OB Status Smoking Status 1.575 m 69.9 kg 28.17 kg/m2 Hysterectomy Former Smoker Emergency Contacts Name Discharge Info Relation Home Work Mobile Victor Hugo Tenorio DISCHARGE CAREGIVER [3] Spouse [3] [phone] Patient Belongings The following personal items are in your possession at time of discharge: 
  Dental Appliances: None Please provide this summary of care documentation to your next provider. Signatures-by signing, you are acknowledging that this After Visit Summary has been reviewed with you and you have received a copy. Patient Signature:  ____________________________________________________________ Date:  ____________________________________________________________  
  
Rhonda Finger Provider Signature:  ____________________________________________________________ Date:  ____________________________________________________________

## 2018-06-12 NOTE — ANESTHESIA PREPROCEDURE EVALUATION
Anesthetic History   No history of anesthetic complications            Review of Systems / Medical History  Patient summary reviewed, nursing notes reviewed and pertinent labs reviewed    Pulmonary  Within defined limits                 Neuro/Psych         Psychiatric history     Cardiovascular  Within defined limits                Exercise tolerance: >4 METS     GI/Hepatic/Renal     GERD: well controlled           Endo/Other  Within defined limits           Other Findings              Physical Exam    Airway  Mallampati: II      Mouth opening: Normal     Cardiovascular  Regular rate and rhythm,  S1 and S2 normal,  no murmur, click, rub, or gallop             Dental  No notable dental hx       Pulmonary  Breath sounds clear to auscultation               Abdominal         Other Findings            Anesthetic Plan    ASA: 2  Anesthesia type: total IV anesthesia            Anesthetic plan and risks discussed with: Patient and Spouse      Discussed TIVA with its benefits (lower risk of nausea and sore throat, etc.) and risks including possible awareness, patient understands and elects to proceed

## 2018-06-12 NOTE — PROCEDURES
Procedure in Detail:  Informed consent was obtained for the procedure. The patient was placed in the left lateral decubitus position and sedation was induced by anesthesia. The [id number] was inserted into the rectum and advanced under direct vision to the cecum, which was identified by the ileocecal valve and appendiceal orifice. The quality of the colonic preparation was excellent. A careful inspection was made as the colonoscope was withdrawn, including a retroflexed view of the rectum; findings and interventions are described below. Appropriate photodocumentation was obtained. Findings:   ANUS: Anal exam reveals no masses or hemorrhoids, sphincter tone is normal.   RECTUM: Rectal exam reveals no masses or hemorrhoids. SIGMOID COLON: The mucosa is normal with good vascular pattern and without ulcers, diverticula, and polyps. DESCENDING COLON: The mucosa is normal with good vascular pattern and without ulcers, diverticula, and polyps. SPLENIC FLEXURE: The splenic flexure is normal.   TRANSVERSE COLON: The mucosa is normal with good vascular pattern and without ulcers, diverticula, and polyps. HEPATIC FLEXURE: The hepatic flexure is normal.   ASCENDING COLON: The mucosa is normal with good vascular pattern and without ulcers, diverticula, and polyps. CECUM: The appendiceal orifice appears normal. The ileocecal valve appears normal.   TERMINAL ILEUM: The terminal ileum was not entered. Specimens: No specimens were collected. Complications: None; patient tolerated the procedure well. \    EBL - none    Recommendations:   - Repeat colonoscopy in 5 years.      Signed By: Vivian Ca DO                        June 12, 2018

## 2018-06-12 NOTE — ANESTHESIA POSTPROCEDURE EVALUATION
Post-Anesthesia Evaluation and Assessment    Patient: Micaela Rose MRN: [id number]  SSN: [id number]    YOB: 1965  Age: 46 y.o. Sex: female       Cardiovascular Function/Vital Signs  Visit Vitals    /70    Pulse 78    Temp 36.7 °C (98 °F)    Resp 12    Ht 5' 2\" (1.575 m)    Wt 69.9 kg (154 lb)    SpO2 98%    BMI 28.17 kg/m2       Patient is status post total IV anesthesia anesthesia for Procedure(s):  COLONOSCOPY  BMI 28. Nausea/Vomiting: None    Postoperative hydration reviewed and adequate. Pain:  Pain Scale 1: Visual (06/12/18 1248)  Pain Intensity 1: 0 (06/12/18 1248)   Managed    Neurological Status: At baseline    Mental Status and Level of Consciousness: Arousable    Pulmonary Status:   O2 Device: Nasal cannula (06/12/18 1248)   Adequate oxygenation and airway patent    Complications related to anesthesia: None    Post-anesthesia assessment completed.  No concerns    Signed By: El Sampson MD     June 12, 2018

## 2019-05-29 ENCOUNTER — HOSPITAL ENCOUNTER (OUTPATIENT)
Dept: PHYSICAL THERAPY | Age: 54
Discharge: HOME | End: 2019-05-29
Payer: COMMERCIAL

## 2019-05-29 PROCEDURE — 97110 THERAPEUTIC EXERCISES: CPT

## 2019-05-29 PROCEDURE — 97161 PT EVAL LOW COMPLEX 20 MIN: CPT

## 2019-05-29 NOTE — THERAPY EVALUATION
Hina Tenorio  : 1965  Primary: [facility]  Secondary:  [address] at [facility] JACEY REINOSO  [address] [facility], [address] [place] [address] [address], [address] [facility]  [phone]   [phone]         OUTPATIENT PHYSICAL THERAPY:Initial Assessment 2019   ICD-10: Treatment Diagnosis: Pain in right hip [M25.551],     Precautions/Allergies:   Patient has no known allergies. TREATMENT PLAN:  Effective Dates: 2019 TO 7/10/2019. Frequency/Duration: 2 visits per week for 6 weeks MEDICAL/REFERRING DIAGNOSIS:  Trochanteric bursitis, right hip [M70.61]  Pain in right hip [M25.551]   DATE OF ONSET: 2018  REFERRING PHYSICIAN: Jennifer Chandler MD MD Orders: Evaluate and treat  Return MD Appointment: 4 weeks (from 19)     INITIAL ASSESSMENT:  Ms. Elda Paul presents with R LE discomfort that is located in both her R hip and R gluteals. Patient exhibits pain with piriformis provocation tests, pain with lumbar extension, tenderness over R greater trochanter, and pain with neutral tension tests. Patient is likely to benefit from skilled PT intervention to improve symptoms and facilitate improved symptoms. PROBLEM LIST (Impacting functional limitations):  1. Increased Pain  2. Decreased Activity Tolerance  3. Decreased [place] with Home Exercise Program INTERVENTIONS PLANNED: (Treatment may consist of any combination of the following)  1. Home Exercise Program (HEP)  2. Manual Therapy  3. Therapeutic Exercise/Strengthening  4.  Ultrasound (US)     GOALS: (Goals have been discussed and agreed upon with patient.)  Discharge Goals: Time Frame: 6 weeks  1.) Patient will report R LE pain 1-2/10 at worst, and improve score on LEFS to 60/80  2.) Patient will report 75% improvement in overall symptoms  3.) Patient will be able to stand for prolonged periods without limitation from R hip/low back pain  4.) Patient will be independent with final HEP    OUTCOME MEASURE:   Tool Used: Lower Extremity Functional Scale (LEFS)  Score:  Initial: 50/80 Most Recent: X/80 (Date: -- )   Interpretation of Score: 20 questions each scored on a 5 point scale with 0 representing \"extreme difficulty or unable to perform\" and 4 representing \"no difficulty\". The lower the score, the greater the functional disability. 80/80 represents no disability. Minimal detectable change is 9 points. MEDICAL NECESSITY:   · Skilled intervention continues to be required due to R hip and gluteal pain which impairs her activity participation. REASON FOR SERVICES/OTHER COMMENTS:  · Patient continues to require skilled intervention due to R LE pain with subsequent decrease in functional mobility. Total Duration:43 minutes  PT Patient Time In/Time Out  Time In: 9907  Time Out: 1646    Rehabilitation Potential For Stated Goals: Good     Regarding Hina Tenorio's therapy, I certify that the treatment plan above will be carried out by a therapist or under their direction. Thank you for this referral,      Jourdan Tellez, PT           PAIN/SUBJECTIVE:   Initial:   5/10 Post Session:  No VAS; \"Pain is not much different\"   HISTORY:   History of Injury/Illness (Reason for Referral):  Reports that her pain began insidiously in December 2018. Had an injection when she saw MD 2 weeks ago, and that has helped some. She was originally having pain down her R leg, but since the injection that has ceased. Past Medical History/Comorbidities:   Ms. Henry Gu  has a past medical history of Anxiety, Chronic insomnia, GERD (gastroesophageal reflux disease), History of tobacco use, and Osteoarthritis of hands, bilateral.  Ms. Henry Gu  has a past surgical history that includes hx cholecystectomy; hx colonoscopy; hx cervical diskectomy (04/2016); hx lopez and bso; hx carpal tunnel release (Left, 10/2017); and colonoscopy (N/A, 6/12/2018).   Social History/Living Environment:    Patient lives with her  in a one story house with 2 steps to enter  Prior Level of Function/Work/Activity:  Pt works full-time in a school cafeteria. Ambulatory/Rehab Services H2 Model Falls Risk Assessment   Risk Factors:       No Risk Factors Identified Ability to Rise from Chair:       (1)  Pushes up, successful in one attempt   Falls Prevention Plan:       No modifications necessary   Total: (5 or greater = High Risk): 1   ©2010 [facility] [place]. All Rights Reserved. [facility] [place] Patent #7,902,266. Federal Law prohibits the replication, distribution or use without written permission from [facility]   Current Medications:       Current Outpatient Medications:     busPIRone (BUSPAR) 15 mg tablet, TAKE 2 TABLETS DAILY, Disp: 180 Tab, Rfl: 1    DULoxetine (CYMBALTA) 60 mg capsule, TAKE ONE CAPSULE DAILY, Disp: 90 Cap, Rfl: 1    LORazepam (ATIVAN) 1 mg tablet, Take 1 Tab by mouth two (2) times daily as needed for Anxiety. , Disp: 60 Tab, Rfl: 5   - Advil, BC Powder    Date Last Reviewed:  5-30-19   Number of Personal Factors/Comorbidities that affect the Plan of Care: 1-2: MODERATE COMPLEXITY   EXAMINATION:   5-30-19    Observation/Orthostatic Postural Assessment:          Patient ambulates on level surfaces and up/down stairs without antalgic gait pattern. Ascends/descends stairs with reciprocal gait pattern. Kyphotic posture. Palpation:          Tender to R greater trochanter, R PSIS and R piriformis. Restricted R PSIS movement with seated hip flexion and with seated pelvic rotation/extension. ROM:          Forward bending at lumbar spine within functional limits. Back bending/lumbar extension within functional limits with some discomfort. No pain or limitation with lumbar rotation or side-bending to either side. Hip AROM bilateral within functional limits for all movements.    Strength:          Hip flexion: 5/5 B        Hip ABD: 5/5 R        Knee extension: 5/5 B        Knee flexion: 5/5 B        Dorsiflexion: 5/5 B  Special Tests: Positive piriformis provocation test on R LE (negative on L), positive R straight leg raise (negative on L), positive slump test on R (negative on L), negative SI provocation test (GARCÍA, SI gapping/distraction, B ASIS/PSIS and medial malleoli level), negative FADIR and SCOUR on R LE  Neurological Screen:       Sensation to light touch intact along bilateral LE dermatomes  Functional Mobility:        Gait: Independent per above       Transfers: pushes to standing, independent       Stairs: Independent per above   Body Structures Involved:  1. Nerves  2. Bones  3. Joints  4. Muscles Body Functions Affected:  1. Sensory/Pain  2. Neuromusculoskeletal  3. Movement Related Activities and Participation Affected:  1. General Tasks and Demands  2.  Mobility   Number of elements (examined above) that affect the Plan of Care: 4+: HIGH COMPLEXITY   CLINICAL PRESENTATION:   Presentation: Stable and uncomplicated: LOW COMPLEXITY   CLINICAL DECISION MAKING:   Use of outcome tool(s) and clinical judgement create a POC that gives a: Clear prediction of patient's progress: LOW COMPLEXITY

## 2019-05-30 NOTE — PROGRESS NOTES
Hina Tenorio  : 1965  Payor: [facility] / Plan: [address] / Product Type: PPO /  [address] at [facility] JACEY REINOSO  1101 [facility], [address], [address] [facility]  [phone]   [phone]       OUTPATIENT PHYSICAL THERAPY: Daily Treatment Note 19     ICD-10: Treatment Diagnosis:  Pain in right hip [M25.551]  Precautions/Allergies:   Patient has no known allergies. MD Orders: Evaluate and treat MEDICAL/REFERRING DIAGNOSIS:  Trochanteric bursitis, right hip [M70.61]  Pain in right hip [M25.551]   DATE OF ONSET: 2018  REFERRING PHYSICIAN: Mine Akers MD  RETURN PHYSICIAN APPOINTMENT: 4 weeks (from 19)       Pre-treatment Symptoms/Complaints:  Patient reports that she is unsure of how her symptoms could have developed. Has difficulty with long duration standing, which is required for her work. Pain: Initial:   5/10 Post Session:  No VAS; \"Pain is not much different\"   Medications Last Reviewed:  19    Updated Objective Findings:   See evaluation note from today     TREATMENT:     THERAPEUTIC EXERCISE: (15 minutes) to improve R LE/low back symptoms. Patient performed posterior pelvic tilts (2 x 10), single knee-to-chest stretch (10x10\"ea LE), and seated pelvic tilts (x 20) to reduce discomfort in R hip. Cues and demonstration provided as needed to ensure proper performance. HEP: Patient received handout for exercises listed above, and demonstrated good performance with each. Treatment/Session Summary:    · Response to Treatment:  No distal R LE symptoms experienced during PT today. · Communication/Consultation:  None today  · Equipment provided today:  None today  · Recommendations/Intent for next treatment session: Next visit will focus on reducing R LE symptoms. Treatment Plan of Care Effective Dates:  19 to 7/10/2019. Frequency/Duration: 2 visits per week for 6 weeks.      Visit Count:  1    Total Treatment Billable Duration:  43 (15 minutes for treatment)  PT Patient Time In/Time Out  Time In: 1603  Time Out: [address] Jessica Mcgarry, PT

## 2019-06-05 ENCOUNTER — HOSPITAL ENCOUNTER (OUTPATIENT)
Dept: PHYSICAL THERAPY | Age: 54
Discharge: HOME | End: 2019-06-05
Payer: COMMERCIAL

## 2019-06-05 PROCEDURE — 97110 THERAPEUTIC EXERCISES: CPT

## 2019-06-05 NOTE — PROGRESS NOTES
Hina Tenorio  : 1965  Payor: [facility] / Plan: [address] / Product Type: PPO /  [address]  at [facility] JACEY REINOSO  [address] [place] [address], [address], [place] [address].  [phone]   [phone]       OUTPATIENT PHYSICAL THERAPY: Daily Treatment Note 19     ICD-10: Treatment Diagnosis:  Pain in right hip [M25.551]  Precautions/Allergies:   Patient has no known allergies. MD Orders: Evaluate and treat MEDICAL/REFERRING DIAGNOSIS:  Trochanteric bursitis, right hip [M70.61]  Pain in right hip [M25.551]   DATE OF ONSET: 2018  REFERRING PHYSICIAN: Prakash Antonio MD  RETURN PHYSICIAN APPOINTMENT: 4 weeks (from 19)       Pre-treatment Symptoms/Complaints:  Bella reports that her pain has been much better, and that she doesn't really have any pain today. Pain: Initial:   0/10 Post Session:    Medications Last Reviewed:  19    Updated Objective Findings:   None Today     TREATMENT:     THERAPEUTIC EXERCISE: (34 minutes) to improve R LE/low back symptoms. Patient performed posterior pelvic tilts (2 x 10), single knee-to-chest stretch (10x10\"ea LE), and seated pelvic tilts (x 20) to reduce discomfort in R hip. Cues and demonstration provided as needed to ensure proper performance. Manual stretching to R piriformis/glute in supine, 5 x 20\". Passive ranging to R LE for reduced R LE discomfort and tightness. Date:  19 Date:   Date:     Activity/Exercise Parameters Parameters Parameters   Posterior pelvic tilt X 10    PPT + hip ADD x 30    PPT + hip ABD x 30     PPT + bridging 3 x 10      Knee to chest stretch 10 x 10\" ea LE     Lower trunk rotations X 15 ea     Calf stretch Slantboard   3 x 20\" B                          MANUAL THERAPY (2 minutes): Soft tissue mobilizations to reduce tightness in R piriformis and gluteals.        HEP: No changes today    Treatment/Session Summary:    · Response to Treatment:  Appears to have made excellent progress with symptoms. Occasional cues, both tactile and verbal, to perform pelvic tilts correctly. · Communication/Consultation:  None today  · Equipment provided today:  None today  · Recommendations/Intent for next treatment session: Next visit will focus on reducing R LE symptoms. Treatment Plan of Care Effective Dates:  5/29/19 to 7/10/2019. Frequency/Duration: 2 visits per week for 6 weeks.      Visit Count:  2    Total Treatment Billable Duration:  36 minutesPT Patient Time In/Time Out  Time In: 1520  Time Out: [address] Sky PT

## 2019-06-06 ENCOUNTER — HOSPITAL ENCOUNTER (OUTPATIENT)
Dept: PHYSICAL THERAPY | Age: 54
Discharge: HOME | End: 2019-06-06
Payer: COMMERCIAL

## 2019-06-06 PROCEDURE — 97110 THERAPEUTIC EXERCISES: CPT

## 2019-06-06 PROCEDURE — 97140 MANUAL THERAPY 1/> REGIONS: CPT

## 2019-06-06 NOTE — PROGRESS NOTES
Hina Tenorio  : 1965  Payor: [facility] / Plan: [address] / Product Type: PPO /  [address]  at [facility] JACEY REINOSO  [address] [facility], [address], [place] [address].  [phone]   [phone]       OUTPATIENT PHYSICAL THERAPY: Daily Treatment Note 19     ICD-10: Treatment Diagnosis:  Pain in right hip [M25.551]  Precautions/Allergies:   Patient has no known allergies. MD Orders: Evaluate and treat MEDICAL/REFERRING DIAGNOSIS:  Trochanteric bursitis, right hip [M70.61]  Pain in right hip [M25.551]   DATE OF ONSET: 2018  REFERRING PHYSICIAN: Isadora Guerin MD  RETURN PHYSICIAN APPOINTMENT: 4 weeks (from 19)       Pre-treatment Symptoms/Complaints:  Bella reports that she is not having pain now, but woke up with some low back pain on R side. Pain: Initial:   0/10 Post Session: 0/10   Medications Last Reviewed:  19    Updated Objective Findings:   None Today     TREATMENT:     THERAPEUTIC EXERCISE: (31 minutes) to improve R LE/low back symptoms. Piriformis strethc (3 x 20\") in supine and with R LE crossed over L knee (3 x 20\"). Date:  19 Date:  19 Date:     Activity/Exercise Parameters Parameters Parameters   Posterior pelvic tilt X 10    PPT + hip ADD x 30    PPT + hip ABD x 30     PPT + bridging 3 x 10  X 20    PPT + hip ADD x 30    PPT + hip ABD x 30    PPT + bridging 3 x 10    Knee to chest stretch 10 x 10\" ea LE 10 x 10\" ea LE    Lower trunk rotations X 15 ea X 20 ea    Calf stretch Slantboard   3 x 20\" B                          MANUAL THERAPY (10 minutes) to reduce R sided low back stiffness. Grade 3 central posterior-anterior joint mobilizations to R SI joint to reduce discomfort. HEP: No changes today    Treatment/Session Summary:    · Response to Treatment:  Patient continues to be only minimally limited by discomfort.  Continues to require frequent cues for  Posterior pelvic tilt as patient maintains somewhat in anterior pelvic tilt. · Communication/Consultation:  None today  · Equipment provided today:  None today  · Recommendations/Intent for next treatment session: Next visit will focus on reducing R LE symptoms. Treatment Plan of Care Effective Dates:  5/29/19 to 7/10/2019. Frequency/Duration: 2 visits per week for 6 weeks.      Visit Count:  3    Total Treatment Billable Duration:  41 minutes   PT Patient Time In/Time Out  Time In: 1535  Time Out: [address], PT

## 2019-06-13 ENCOUNTER — HOSPITAL ENCOUNTER (OUTPATIENT)
Dept: PHYSICAL THERAPY | Age: 54
Discharge: HOME | End: 2019-06-13
Payer: COMMERCIAL

## 2019-06-13 NOTE — PROGRESS NOTES
Hina Tenorio  : 1965  Payor: [facility] / Plan: [address] / Product Type: PPO /  [address] [employer]  at [facility] [address], [address], [address] [facility]  [phone]   [phone]       OUTPATIENT PHYSICAL THERAPY: Cancellation Note 19     ICD-10: Treatment Diagnosis:  Pain in right hip [M25.551]  Precautions/Allergies:   Patient has no known allergies. MD Orders: Evaluate and treat MEDICAL/REFERRING DIAGNOSIS:  Trochanteric bursitis, right hip [M70.61]  Pain in right hip [M25.551]   DATE OF ONSET: 2018  REFERRING PHYSICIAN: Stephanie Peters MD  RETURN PHYSICIAN APPOINTMENT: 4 weeks (from 19)       Mrs. Tenorio cancelled today's appointment secondary to a family emergency.      Radha Peters, PT  2019

## 2019-06-18 ENCOUNTER — HOSPITAL ENCOUNTER (OUTPATIENT)
Dept: PHYSICAL THERAPY | Age: 54
Discharge: HOME | End: 2019-06-18
Payer: COMMERCIAL

## 2019-06-18 PROCEDURE — 97140 MANUAL THERAPY 1/> REGIONS: CPT

## 2019-06-18 PROCEDURE — 97110 THERAPEUTIC EXERCISES: CPT

## 2019-06-18 NOTE — PROGRESS NOTES
Hina Tenorio  : 1965  Payor: [facility] / Plan: [address] / Product Type: PPO /  [address]  at [facility] JACEY REINOSO  [facility], [address], [place] [address].  [phone]   [phone]       OUTPATIENT PHYSICAL THERAPY: Daily Treatment Note 19     ICD-10: Treatment Diagnosis:  Pain in right hip [M25.551]  Precautions/Allergies:   Patient has no known allergies. MD Orders: Evaluate and treat MEDICAL/REFERRING DIAGNOSIS:  Trochanteric bursitis, right hip [M70.61]  Pain in right hip [M25.551]   DATE OF ONSET: 2018  REFERRING PHYSICIAN: Pamela Trinh MD  RETURN PHYSICIAN APPOINTMENT: 4 weeks (from 19)       Pre-treatment Symptoms/Complaints:  Has been having good days and bad days, and today is a bad day.  is in the hospital and has been walking more. Pain: Initial:   5/10 Post Session: 3/10   Medications Last Reviewed:  19    Updated Objective Findings:   None Today     TREATMENT:     THERAPEUTIC EXERCISE: (25 minutes) to improve R LE/low back symptoms. Performed muscle-energy technique to address innominate rotation with anteriorly rotated L innominate, with successful reduction in leg length discrepancy following with supine to long sitting test.     Date:  19 Date:  19 Date:  19   Activity/Exercise Parameters Parameters Parameters   Posterior pelvic tilt X 10    PPT + hip ADD x 30    PPT + hip ABD x 30     PPT + bridging 3 x 10  X 20, 5\"    PPT + hip ADD x 20    PPT + hip ABD (blue) x 20     3 x 10, 5\" ea    PPT + hip ADD 3 x 10    PPT + hip ABD (blue) 3 x 10    Bridging  3 x 10 B   Knee to chest stretch 10 x 10\" ea LE 10 x 10\" ea LE    Lower trunk rotations X 15 ea     Calf stretch Slantboard   3 x 20\" B                          MANUAL THERAPY (15 minutes): Soft tissue mobilizations to reduce tightness in lumbar paraspinals.  Grade 3 posterior-anterior mobilizations to R SI joint to improve mobility and reduce discomfort. Applied OrthoGel to R lumbar paraspinals to reduce low back discomfort. HEP: No changes today    Treatment/Session Summary:    · Response to Treatment:  Arrived to PT with pelvic obliquity once again, which was corrected with MET. Required frequent tacitle cues for posterior pelvic tilt as patient has difficulty \"flattening lower back\". · Communication/Consultation:  None today  · Equipment provided today:  None today  · Recommendations/Intent for next treatment session: Next visit will focus on reducing R LE symptoms. Treatment Plan of Care Effective Dates:  5/29/19 to 7/10/2019. Frequency/Duration: 2 visits per week for 6 weeks.      Visit Count:  4    Total Treatment Billable Duration:  40 minutes   PT Patient Time In/Time Out  Time In: 1118  Time Out: Paz Zamarripa, ESTELA

## 2019-06-25 ENCOUNTER — HOSPITAL ENCOUNTER (OUTPATIENT)
Dept: PHYSICAL THERAPY | Age: 54
Discharge: HOME | End: 2019-06-25
Payer: COMMERCIAL

## 2019-06-25 PROCEDURE — 97110 THERAPEUTIC EXERCISES: CPT

## 2019-06-25 NOTE — PROGRESS NOTES
Hina Tenorio  : 1965  Payor: [facility] / Plan: [address] / Product Type: PPO /  [address]  at [facility] JACEY REINOSO  [address] [facility], [address], [place] [address].  [phone]   [phone]       OUTPATIENT PHYSICAL THERAPY: Daily Treatment Note 19     ICD-10: Treatment Diagnosis:  Pain in right hip [M25.551]  Precautions/Allergies:   Patient has no known allergies. MD Orders: Evaluate and treat MEDICAL/REFERRING DIAGNOSIS:  Trochanteric bursitis, right hip [M70.61]  Pain in right hip [M25.551]   DATE OF ONSET: 2018  REFERRING PHYSICIAN: Jennifer Chandler MD  RETURN PHYSICIAN APPOINTMENT: 4 weeks (from 19)       Pre-treatment Symptoms/Complaints:  Reports that she has very little discomfort today, and that she has had very little pain for several days. Sees Dr. Valeri Torres today. Pain: Initial:   No pain Post Session: No VAS   Medications Last Reviewed:  19    Updated Objective Findings:   ASIS uneven, with elevated L PSIS and L medial malleoli. TREATMENT:     THERAPEUTIC EXERCISE: (40 minutes) to improve R LE/low back symptoms. Performed muscle-energy technique to address innominate rotation with anteriorly rotated L innominate, with successful reduction in leg length discrepancy following with supine to long sitting test. Modifed Matias Test hip flexor stretch (3 x 20\") to each LE to improve hip flexor tissue extensibility.      Date:  19 Date:  19 Date:  19 Date  19   Activity/Exercise Parameters Parameters Parameters    Posterior pelvic tilt X 10    PPT + hip ADD x 30    PPT + hip ABD x 30     PPT + bridging 3 x 10  X 20, 5\"    PPT + hip ADD x 20    PPT + hip ABD (blue) x 20     3 x 10, 5\" ea    PPT + hip ADD 3 x 10    PPT + hip ABD (blue) 3 x 10    Bridging  3 x 10 B X 20, 5\"    PPT + hip ADD x 30    PPT + hip ABD x 30    Bridging 3 x 10     Toe taps, hook-lying with PPT 3 x 5 ea    Knee to chest stretch 10 x 10\" ea LE 10 x 10\" ea LE     Lower trunk rotations X 15 ea      Calf stretch Slantboard   3 x 20\" B                              MANUAL THERAPY (0 minutes): none. HEP: Added posterior pelvic tilt with hip adduction and hip abduction to HEP. Provided patient with blue band. Patient verbalized understanding. Treatment/Session Summary:    · Response to Treatment:  Good performance with therapeutic exercises today. Improved performance with pelvic tilts, with less cueing required to perform. Able to perform toe taps while maintaining posterior pelvic tilt. · Communication/Consultation:  None today  · Equipment provided today:  None today  · Recommendations/Intent for next treatment session: Next visit will focus on reducing R LE symptoms. Treatment Plan of Care Effective Dates:  5/29/19 to 7/10/2019. Frequency/Duration: 2 visits per week for 6 weeks.      Visit Count:  5    Total Treatment Billable Duration:  40 minutes   PT Patient Time In/Time Out  Time In: 1025  Time Out: [address]ie [address], PT

## 2019-06-25 NOTE — PROGRESS NOTES
Hina Coby  : 1965  Payor: [facility] / Plan: [address] / Product Type: PPO /  [address] [facility], [address], [address] [facility]  [phone]   [phone]       OUTPATIENT PHYSICAL THERAPY: MD Communication 19     ICD-10: Treatment Diagnosis:  Pain in right hip [M25.551]  Precautions/Allergies:   Patient has no known allergies. MD Orders: Evaluate and treat MEDICAL/REFERRING DIAGNOSIS:  Trochanteric bursitis, right hip [M70.61]  Pain in right hip [M25.551]   DATE OF ONSET: 2018  REFERRING PHYSICIAN: Suyapa Agee MD  RETURN PHYSICIAN APPOINTMENT: 4 weeks (from 19)       Mrs. Marques Orourke has attended 5 PT appointments, and does demonstrate good progress. Her pain today was absent, but she did have low back discomfort at her previous appointment. She does have difficulty with sacroiliac stability, and cueing for performance of core strengthening. Patient is likely to benefit from continued PT, and is doing well. She has not had any instances of distal R LE symptoms since the injection she received prior to starting PT.     Gloria Fails, PT  2019

## 2019-06-27 ENCOUNTER — HOSPITAL ENCOUNTER (OUTPATIENT)
Dept: PHYSICAL THERAPY | Age: 54
Discharge: HOME | End: 2019-06-27
Payer: COMMERCIAL

## 2019-06-27 PROCEDURE — 97110 THERAPEUTIC EXERCISES: CPT

## 2019-06-27 NOTE — PROGRESS NOTES
Hina Tenorio  : 1965  Payor: [facility] / Plan: [address] / Product Type: PPO /  [address] at [facility] JACEY REINOSO  [address] [place] [address], [address], [address] [facility]  [phone]   [phone]       OUTPATIENT PHYSICAL THERAPY: Daily Treatment Note 19     ICD-10: Treatment Diagnosis:  Pain in right hip [M25.551]  Precautions/Allergies:   Patient has no known allergies. MD Orders: Evaluate and treat MEDICAL/REFERRING DIAGNOSIS:  Trochanteric bursitis, right hip [M70.61]  Pain in right hip [M25.551]   DATE OF ONSET: 2018  REFERRING PHYSICIAN: Brunilda Egan MD  RETURN PHYSICIAN APPOINTMENT: 6 weeks (19)       Pre-treatment Symptoms/Complaints:  Dr. Donald Womack advised her to continue with PT. Will see her again in 6 weeks. Pain is mostly present when she sits for prolonged periods. Pain: Initial:   No pain Post Session: No VAS   Medications Last Reviewed:  19    Updated Objective Findings:   Medial malleoli even bilaterally in supine     TREATMENT:     THERAPEUTIC EXERCISE: (40 minutes) to improve R LE/low back symptoms.       Date:  19 Date:  19 Date:  19 Date  19 Date  19   Activity/Exercise Parameters Parameters Parameters     Posterior pelvic tilt X 10    PPT + hip ADD x 30    PPT + hip ABD x 30     PPT + bridging 3 x 10  X 20, 5\"    PPT + hip ADD x 20    PPT + hip ABD (blue) x 20     3 x 10, 5\" ea    PPT + hip ADD 3 x 10    PPT + hip ABD (blue) 3 x 10    Bridging  3 x 10 B X 20, 5\"    PPT + hip ADD x 30    PPT + hip ABD x 30    Bridging 3 x 10     Toe taps, hook-lying with PPT 3 x 5 ea  PPT + hip ABD (blue) x 20    Bridging 3 x 10     PPT + alt marches x 20 ea    PPT + heel slides 3 x 10 ea LE    PPT + toe taps in hook-lying  3 x 8 ea       Knee to chest stretch 10 x 10\" ea LE 10 x 10\" ea LE      Lower trunk rotations X 15 ea       Calf stretch Slantboard   3 x 20\" B        Hip abduction     Clamshells  Blue, 3 x 10 MANUAL THERAPY (0 minutes): none. HEP: Reviewed performance of pelvic tilts +  Hip abduction to ensure proper placement of band. No new exercises added today. Pt encouraged to continue with PT. Treatment/Session Summary:    · Response to Treatment:  Required tactile cueing to perform exercises correctly. Continues to be minimally limited by symptoms. · Communication/Consultation:  None today  · Equipment provided today:  None today  · Recommendations/Intent for next treatment session: Next visit will focus on reducing R LE symptoms. Treatment Plan of Care Effective Dates:  5/29/19 to 7/10/2019. Frequency/Duration: 2 visits per week for 6 weeks.      Visit Count:  6    Total Treatment Billable Duration:  40 minutes   PT Patient Time In/Time Out  Time In: 1305  Time Out: Paz Zamarripa, PT

## 2019-07-01 ENCOUNTER — HOSPITAL ENCOUNTER (OUTPATIENT)
Dept: PHYSICAL THERAPY | Age: 54
Discharge: HOME | End: 2019-07-01

## 2019-07-01 NOTE — PROGRESS NOTES
Hina Howardpietro  : 1965  Payor: [facility] / Plan: [address] / Product Type: PPO /  [facility], [address], [place] [address].  [phone]   [phone]       OUTPATIENT PHYSICAL THERAPY: Cancellation Note 19     ICD-10: Treatment Diagnosis:  Pain in right hip [M25.551]  Precautions/Allergies:   Patient has no known allergies. MD Orders: Evaluate and treat MEDICAL/REFERRING DIAGNOSIS:  Trochanteric bursitis, right hip [M70.61]  Pain in right hip [M25.551]   DATE OF ONSET: 2018  REFERRING PHYSICIAN: Magy Yuen MD  RETURN PHYSICIAN APPOINTMENT: 6 weeks (19)     Patient called to cancel today's appointment but did not list a reason as to why. PT will continue plan of care at next appointment.     Julito Leblanc, PT  2019

## 2020-02-26 ENCOUNTER — HOSPITAL ENCOUNTER (OUTPATIENT)
Dept: MAMMOGRAPHY | Age: 55
Discharge: HOME | End: 2020-02-26
Attending: INTERNAL MEDICINE
Payer: COMMERCIAL

## 2020-02-26 DIAGNOSIS — Z12.31 VISIT FOR SCREENING MAMMOGRAM: ICD-10-CM

## 2020-02-26 PROCEDURE — 77067 SCR MAMMO BI INCL CAD: CPT

## 2021-02-25 PROBLEM — K21.9 GASTROESOPHAGEAL REFLUX DISEASE: Status: ACTIVE | Noted: 2021-02-25

## 2021-03-11 ENCOUNTER — HOSPITAL ENCOUNTER (OUTPATIENT)
Dept: MAMMOGRAPHY | Age: 56
Discharge: HOME | End: 2021-03-11
Attending: NURSE PRACTITIONER

## 2021-03-11 DIAGNOSIS — Z12.31 ENCOUNTER FOR SCREENING MAMMOGRAM FOR BREAST CANCER: ICD-10-CM

## 2021-03-12 LAB — MAMMOGRAPHY, EXTERNAL: NORMAL

## 2021-03-12 NOTE — PROGRESS NOTES
Attempt to notify patient per Gonzalo Ruiz NP was unsuccessful, voicemail to return call was NOT made, voicemail was full.

## 2022-01-18 ENCOUNTER — HOSPITAL ENCOUNTER (OUTPATIENT)
Dept: ULTRASOUND IMAGING | Age: 57
Discharge: HOME | End: 2022-01-18
Attending: NURSE PRACTITIONER
Payer: COMMERCIAL

## 2022-01-18 DIAGNOSIS — N18.31 STAGE 3A CHRONIC KIDNEY DISEASE (HCC): ICD-10-CM

## 2022-01-18 PROCEDURE — 76770 US EXAM ABDO BACK WALL COMP: CPT

## 2022-03-01 NOTE — BRIEF OP NOTE
BRIEF OPERATIVE NOTE    Date of Procedure: 10/19/2017   Preoperative Diagnosis: Carpal tunnel syndrome, left [G56.02]  Postoperative Diagnosis: LEFT CARPAL TUNNEL SYNDROME    Procedure(s):  HAND CARPAL TUNNEL RELEASE ENDOSCOPIC LEFT  Surgeon(s) and Role:     * Clementina Delgadillo MD - Primary         Assistant Staff:       Surgical Staff:  Circ-1: Socorro Norman RN  Scrub Tech-1: Bailee Giraldo  Event Time In   Incision Start 1243   Incision Close      Anesthesia: MAC   Estimated Blood Loss: MINIMAL  Specimens: * No specimens in log *   Findings: SEE DICTATION   Complications: NONE  Implants: * No implants in log * Patient Positioning: Supine

## 2022-03-19 PROBLEM — Z80.0 FAMILY HISTORY OF COLON CANCER: Status: ACTIVE | Noted: 2018-06-12

## 2022-03-20 PROBLEM — K21.9 GASTROESOPHAGEAL REFLUX DISEASE: Status: ACTIVE | Noted: 2021-02-25

## 2022-05-16 PROBLEM — N18.30 CHRONIC RENAL DISEASE, STAGE III (HCC): Status: ACTIVE | Noted: 2022-05-16

## 2022-06-08 ENCOUNTER — OFFICE VISIT (OUTPATIENT)
Dept: ORTHOPEDIC SURGERY | Age: 57
End: 2022-06-08
Payer: COMMERCIAL

## 2022-06-08 DIAGNOSIS — M48.061 LUMBAR FORAMINAL STENOSIS: ICD-10-CM

## 2022-06-08 DIAGNOSIS — M48.062 SPINAL STENOSIS OF LUMBAR REGION WITH NEUROGENIC CLAUDICATION: Primary | ICD-10-CM

## 2022-06-08 PROCEDURE — 64483 NJX AA&/STRD TFRM EPI L/S 1: CPT | Performed by: PHYSICAL MEDICINE & REHABILITATION

## 2022-06-08 RX ORDER — METHYLPREDNISOLONE ACETATE 40 MG/ML
80 INJECTION, SUSPENSION INTRA-ARTICULAR; INTRALESIONAL; INTRAMUSCULAR; SOFT TISSUE ONCE
Status: COMPLETED | OUTPATIENT
Start: 2022-06-08 | End: 2022-06-08

## 2022-06-08 RX ADMIN — METHYLPREDNISOLONE ACETATE 80 MG: 40 INJECTION, SUSPENSION INTRA-ARTICULAR; INTRALESIONAL; INTRAMUSCULAR; SOFT TISSUE at 13:51

## 2022-06-08 NOTE — PROGRESS NOTES
Name: Jamie Zheng  YOB: 1965  Gender: female  MRN: [id number]        Transforaminal [facility] Procedure Note    Procedure: Right  L5-S1 transforaminal epidural steroid injections     Precautions: Cleopatra Medina denies prior sensitivity to steroid, local anesthetic, iodine, or shellfish. Consent:  Consent was obtained prior to the procedure. The procedure was discussed at length with Cleopatra Hickey. She was given the opportunity to ask questions regarding the procedure and its associated risks. In addition to the potential risks associated with the procedure itself, the patient was informed both verbally and in writing of potential side effects of the use glucocorticoids. The patient appeared to comprehend the informed consent and desired to have the procedure performed, and informed consent was signed. She was placed in a prone position on the fluoroscopy table and the skin was prepped and draped in a routine sterile fashion. The areas to be injected were each anesthetized with 1 ml of 1% Lidocaine. A 22 gauge 3.5 inch spinal needle was carefully advanced under fluoroscopic guidance to the right L5-S1 transforaminal space  0.5 ml of 70% of Omnipaque was injected to confirm proper needle placement and absence of subdural or vascular flow Once proper placement was confirmed, 0.5ml of 0.25 marcaine and 80 mg of Depo-Medrol were injected through the spinal needle. Fluoroscopic guidance was used intermittently over a 10-minute period to insure proper needle placement and her safety. A hard copy of the fluoroscopic image has been placed in her chart and is saved on the C-arm hard drive. She was monitored for 30 minutes after the procedure and discharged home in a stable fashion with a routine follow up. Procedural Diagnosis:     ICD-10-CM    1.  Spinal stenosis of lumbar region with neurogenic claudication  M48.062 methylPREDNISolone acetate (DEPO-MEDROL) injection 80 mg     FL NERVE BLOCK LUMBOSACRAL 1ST   2.  Lumbar foraminal stenosis  M48.061 methylPREDNISolone acetate (DEPO-MEDROL) injection 80 mg     FL NERVE BLOCK LUMBOSACRAL 1ST      Guero Chaves MD  06/08/22

## 2022-06-22 ENCOUNTER — OFFICE VISIT (OUTPATIENT)
Dept: ORTHOPEDIC SURGERY | Age: 57
End: 2022-06-22
Payer: COMMERCIAL

## 2022-06-22 DIAGNOSIS — M43.16 SPONDYLOLISTHESIS OF LUMBAR REGION: ICD-10-CM

## 2022-06-22 DIAGNOSIS — M48.062 SPINAL STENOSIS OF LUMBAR REGION WITH NEUROGENIC CLAUDICATION: ICD-10-CM

## 2022-06-22 DIAGNOSIS — M48.061 LUMBAR FORAMINAL STENOSIS: Primary | ICD-10-CM

## 2022-06-22 DIAGNOSIS — M48.062 SPINAL STENOSIS, LUMBAR REGION WITH NEUROGENIC CLAUDICATION: ICD-10-CM

## 2022-06-22 PROCEDURE — 99214 OFFICE O/P EST MOD 30 MIN: CPT | Performed by: NURSE PRACTITIONER

## 2022-06-22 RX ORDER — GABAPENTIN 100 MG/1
100-300 CAPSULE ORAL NIGHTLY
Qty: 90 CAPSULE | Refills: 0 | Status: SHIPPED | OUTPATIENT
Start: 2022-06-22 | End: 2022-08-09 | Stop reason: ALTCHOICE

## 2022-06-22 NOTE — PROGRESS NOTES
reflux disease)     managed with medication    History of tobacco use     Osteoarthritis of hands, bilateral      Tobacco:  reports that she quit smoking about 6 years ago. She smoked 0.50 packs per day. She has never used smokeless tobacco.  Alcohol:   Social History     Substance and Sexual Activity   Alcohol Use No          Radiographic Studies:       Assessment/Plan:        ICD-10-CM    1. Lumbar foraminal stenosis  M48.061    2. Spinal stenosis of lumbar region with neurogenic claudication  M48.062    3. Spondylolisthesis of lumbar region  M43.16    4. Spinal stenosis, lumbar region with neurogenic claudication  M48.062         We discussed options. At this point I would repeat an injection since the L5 did not provide any relief. We discussed either its surgical intervention versus trialing neuro modulating medications. Therefore patient would like to trial this. I will start her on gabapentin 100 mg 1-3 p.o. nightly. She may increase to 100 mg twice daily and 300 mg at night if tolerated. We discussed the side effects of gabapentin and how to increase this slowly. - Neuropathic pain treatment: For neuropathic pain relief the patient was given a prescription and counseled regarding the possibility of risks and complications from this medication. Orders Placed This Encounter   Medications    gabapentin (NEURONTIN) 100 MG capsule     Sig: Take 1-3 capsules by mouth nightly for 30 days. Dispense:  90 capsule     Refill:  0        No orders of the defined types were placed in this encounter. 4 This is a chronic illness/condition with exacerbation and progression      Return will call. CHUCHO Wiley - CNP  06/22/22      Elements of this note were created using speech recognition software. As such, errors of speech recognition may be present.

## 2022-08-09 ENCOUNTER — OFFICE VISIT (OUTPATIENT)
Dept: ORTHOPEDIC SURGERY | Age: 57
End: 2022-08-09
Payer: COMMERCIAL

## 2022-08-09 DIAGNOSIS — M43.16 SPONDYLOLISTHESIS OF LUMBAR REGION: Primary | ICD-10-CM

## 2022-08-09 DIAGNOSIS — M48.062 SPINAL STENOSIS OF LUMBAR REGION WITH NEUROGENIC CLAUDICATION: ICD-10-CM

## 2022-08-09 PROCEDURE — 99214 OFFICE O/P EST MOD 30 MIN: CPT | Performed by: NURSE PRACTITIONER

## 2022-08-09 RX ORDER — GABAPENTIN 300 MG/1
300 CAPSULE ORAL NIGHTLY
Qty: 90 CAPSULE | Refills: 2 | Status: SHIPPED | OUTPATIENT
Start: 2022-08-09 | End: 2023-05-06

## 2022-08-09 NOTE — PROGRESS NOTES
Name: Marissa Cruz  YOB: 1965  Gender: female  MRN: [id number]    CC: Follow-up low back and right leg pain    HPI: Patient has known spondylolisthesis of L4 and L5 with lateral recess stenosis and severe right L5 foraminal stenosis. She underwent a right L4 and L5 selective nerve root block with approximately 60% relief. However she still continued to have right L5 radiculopathy complaints. Therefore patient was referred for a right L5 selective nerve root block. She reports no improvement with this last injection. Current treatment has included Robaxin and Celebrex, home guided exercise program since January 25, 2022 and previous epidural injections. Therefore at last visit I started patient on gabapentin. She has found gabapentin to be more effective than the injections. She is currently taking 200 mg at night. She returns back to work next week working in "[employer]. AMB PAIN ASSESSMENT 8/9/2022   Location of Pain Leg   Location Modifiers Right   Severity of Pain 4   Frequency of Pain Intermittent   Limiting Behavior Yes   Result of Injury No   Work-Related Injury No   Are there other pain locations you wish to document? No            No Known Allergies    Current Outpatient Medications:     gabapentin (NEURONTIN) 300 MG capsule, Take 1 capsule by mouth nightly for 270 days. Intended supply: 30 days, Disp: 90 capsule, Rfl: 2    acetaminophen (TYLENOL) 650 MG extended release tablet, Take 650 mg by mouth as needed, Disp: , Rfl:     ARIPiprazole (ABILIFY) 10 MG tablet, Take 10 mg by mouth daily, Disp: , Rfl:     celecoxib (CELEBREX) 100 MG capsule, Take 100 mg by mouth 2 times daily as needed, Disp: , Rfl:     LORazepam (ATIVAN) 1 MG tablet, Take 1 mg by mouth 2 times daily as needed. , Disp: , Rfl:     methocarbamol (ROBAXIN) 750 MG tablet, Take 750 mg by mouth 3 times daily as needed, Disp: , Rfl:     methylPREDNISolone (MEDROL DOSEPACK) 4 MG tablet, FOLLOW PACKAGE INSTRUCTIONS, Disp: , Rfl:     omeprazole (PRILOSEC) 40 MG delayed release capsule, Take 40 mg by mouth daily, Disp: , Rfl:     sertraline (ZOLOFT) 50 MG tablet, Take 50 mg by mouth daily, Disp: , Rfl:     traZODone (DESYREL) 50 MG tablet, Take 50 mg by mouth, Disp: , Rfl:   Past Medical History:   Diagnosis Date    Chronic insomnia     Depression with anxiety     GERD (gastroesophageal reflux disease)     managed with medication    History of tobacco use     Osteoarthritis of hands, bilateral      Tobacco:  reports that she quit smoking about 6 years ago. She smoked an average of .5 packs per day. She has never used smokeless tobacco.  Alcohol:   Social History     Substance and Sexual Activity   Alcohol Use No          Radiographic Studies:       MRI Results (most recent):      Assessment/Plan:        ICD-10-CM    1. Spondylolisthesis of lumbar region  M43.16       2. Spinal stenosis of lumbar region with neurogenic claudication  M48.062            She has known foraminal and lateral recess stenosis due to the listhesis and disc degeneration. She discussed possible surgery next year. She is doing well on neuromodulator medications. We will go ahead and increase her gabapentin to 300 mg at night. I will give her a year supply of this. She will call back if she runs into any problems or is in need of any acute intervention.    -The patient will be treated observantly with self directed symptomatic care. Instructions were given to follow up if there is any neurologic or functional decline.  - Neuropathic pain treatment: For neuropathic pain relief the patient was given a prescription and counseled regarding the possibility of risks and complications from this medication. Orders Placed This Encounter   Medications    gabapentin (NEURONTIN) 300 MG capsule     Sig: Take 1 capsule by mouth nightly for 270 days.  Intended supply: 30 days     Dispense:  90 capsule     Refill:  2        No orders of the defined types were placed in this encounter. 4 This is a chronic illness/condition with exacerbation and progression      Return if symptoms worsen or fail to improve. Murl Kocher, APRN - CNP  08/09/22      Elements of this note were created using speech recognition software. As such, errors of speech recognition may be present.

## 2022-09-01 RX ORDER — CELECOXIB 100 MG/1
100 CAPSULE ORAL 2 TIMES DAILY PRN
Qty: 30 CAPSULE | Refills: 2 | Status: SHIPPED | OUTPATIENT
Start: 2022-09-01

## 2022-09-01 NOTE — TELEPHONE ENCOUNTER
Pt needs a refill of her celecoxib (CELEBREX) 100 MG capsule medication sent to the [facility] on [address] in [facility]

## 2022-11-07 ENCOUNTER — OFFICE VISIT (OUTPATIENT)
Dept: INTERNAL MEDICINE CLINIC | Facility: CLINIC | Age: 57
End: 2022-11-07
Payer: COMMERCIAL

## 2022-11-07 VITALS
OXYGEN SATURATION: 97 % | HEIGHT: 61 IN | WEIGHT: 145.6 LBS | DIASTOLIC BLOOD PRESSURE: 55 MMHG | SYSTOLIC BLOOD PRESSURE: 89 MMHG | BODY MASS INDEX: 27.49 KG/M2 | HEART RATE: 65 BPM | TEMPERATURE: 97 F

## 2022-11-07 DIAGNOSIS — Z00.00 ANNUAL PHYSICAL EXAM: Primary | ICD-10-CM

## 2022-11-07 DIAGNOSIS — F51.04 CHRONIC INSOMNIA: ICD-10-CM

## 2022-11-07 DIAGNOSIS — Z11.4 ENCOUNTER FOR SCREENING FOR HIV: ICD-10-CM

## 2022-11-07 DIAGNOSIS — E55.9 VITAMIN D DEFICIENCY: ICD-10-CM

## 2022-11-07 DIAGNOSIS — N18.31 STAGE 3A CHRONIC KIDNEY DISEASE (HCC): ICD-10-CM

## 2022-11-07 DIAGNOSIS — Z11.59 ENCOUNTER FOR HEPATITIS C SCREENING TEST FOR LOW RISK PATIENT: ICD-10-CM

## 2022-11-07 DIAGNOSIS — M15.9 PRIMARY OSTEOARTHRITIS INVOLVING MULTIPLE JOINTS: ICD-10-CM

## 2022-11-07 DIAGNOSIS — K21.9 GASTROESOPHAGEAL REFLUX DISEASE, UNSPECIFIED WHETHER ESOPHAGITIS PRESENT: ICD-10-CM

## 2022-11-07 DIAGNOSIS — Z00.00 ANNUAL PHYSICAL EXAM: ICD-10-CM

## 2022-11-07 DIAGNOSIS — F41.8 ANXIETY WITH DEPRESSION: ICD-10-CM

## 2022-11-07 DIAGNOSIS — Z12.31 ENCOUNTER FOR SCREENING MAMMOGRAM FOR BREAST CANCER: ICD-10-CM

## 2022-11-07 LAB
25(OH)D3 SERPL-MCNC: 49.1 NG/ML (ref 30–100)
ALBUMIN SERPL-MCNC: 4.1 G/DL (ref 3.5–5)
ALBUMIN/GLOB SERPL: 1.6 {RATIO} (ref 0.4–1.6)
ALP SERPL-CCNC: 64 U/L (ref 50–136)
ALT SERPL-CCNC: 24 U/L (ref 12–65)
ANION GAP SERPL CALC-SCNC: 0 MMOL/L (ref 2–11)
AST SERPL-CCNC: 12 U/L (ref 15–37)
BILIRUB SERPL-MCNC: 0.3 MG/DL (ref 0.2–1.1)
BUN SERPL-MCNC: 26 MG/DL (ref 6–23)
CALCIUM SERPL-MCNC: 9.3 MG/DL (ref 8.3–10.4)
CHLORIDE SERPL-SCNC: 110 MMOL/L (ref 101–110)
CHOLEST SERPL-MCNC: 181 MG/DL
CO2 SERPL-SCNC: 27 MMOL/L (ref 21–32)
CREAT SERPL-MCNC: 1.3 MG/DL (ref 0.6–1)
GLOBULIN SER CALC-MCNC: 2.6 G/DL (ref 2.8–4.5)
GLUCOSE SERPL-MCNC: 81 MG/DL (ref 65–100)
HDLC SERPL-MCNC: 51 MG/DL (ref 40–60)
HDLC SERPL: 3.5 {RATIO}
HIV 1+2 AB+HIV1 P24 AG SERPL QL IA: NONREACTIVE
HIV 1/2 RESULT COMMENT: NORMAL
LDLC SERPL CALC-MCNC: 96.8 MG/DL
POTASSIUM SERPL-SCNC: 4 MMOL/L (ref 3.5–5.1)
PROT SERPL-MCNC: 6.7 G/DL (ref 6.3–8.2)
SODIUM SERPL-SCNC: 137 MMOL/L (ref 133–143)
TRIGL SERPL-MCNC: 166 MG/DL (ref 35–150)
VLDLC SERPL CALC-MCNC: 33.2 MG/DL (ref 6–23)

## 2022-11-07 PROCEDURE — 99396 PREV VISIT EST AGE 40-64: CPT | Performed by: NURSE PRACTITIONER

## 2022-11-07 RX ORDER — CELECOXIB 100 MG/1
100 CAPSULE ORAL 2 TIMES DAILY PRN
Qty: 30 CAPSULE | Refills: 4 | Status: SHIPPED | OUTPATIENT
Start: 2022-11-07

## 2022-11-07 ASSESSMENT — PATIENT HEALTH QUESTIONNAIRE - PHQ9
SUM OF ALL RESPONSES TO PHQ QUESTIONS 1-9: 0
SUM OF ALL RESPONSES TO PHQ QUESTIONS 1-9: 0
3. TROUBLE FALLING OR STAYING ASLEEP: 0
7. TROUBLE CONCENTRATING ON THINGS, SUCH AS READING THE NEWSPAPER OR WATCHING TELEVISION: 0
SUM OF ALL RESPONSES TO PHQ9 QUESTIONS 1 & 2: 0
9. THOUGHTS THAT YOU WOULD BE BETTER OFF DEAD, OR OF HURTING YOURSELF: 0
SUM OF ALL RESPONSES TO PHQ QUESTIONS 1-9: 0
5. POOR APPETITE OR OVEREATING: 0
10. IF YOU CHECKED OFF ANY PROBLEMS, HOW DIFFICULT HAVE THESE PROBLEMS MADE IT FOR YOU TO DO YOUR WORK, TAKE CARE OF THINGS AT HOME, OR GET ALONG WITH OTHER PEOPLE: 0
6. FEELING BAD ABOUT YOURSELF - OR THAT YOU ARE A FAILURE OR HAVE LET YOURSELF OR YOUR FAMILY DOWN: 0
1. LITTLE INTEREST OR PLEASURE IN DOING THINGS: 0
2. FEELING DOWN, DEPRESSED OR HOPELESS: 0
8. MOVING OR SPEAKING SO SLOWLY THAT OTHER PEOPLE COULD HAVE NOTICED. OR THE OPPOSITE, BEING SO FIGETY OR RESTLESS THAT YOU HAVE BEEN MOVING AROUND A LOT MORE THAN USUAL: 0
SUM OF ALL RESPONSES TO PHQ QUESTIONS 1-9: 0
4. FEELING TIRED OR HAVING LITTLE ENERGY: 0

## 2022-11-07 ASSESSMENT — ENCOUNTER SYMPTOMS
NAUSEA: 0
COUGH: 0
VOMITING: 0
ABDOMINAL PAIN: 0
BACK PAIN: 1
SHORTNESS OF BREATH: 0
DIARRHEA: 0
WHEEZING: 0

## 2022-11-07 ASSESSMENT — ANXIETY QUESTIONNAIRES
2. NOT BEING ABLE TO STOP OR CONTROL WORRYING: 0
5. BEING SO RESTLESS THAT IT IS HARD TO SIT STILL: 0
3. WORRYING TOO MUCH ABOUT DIFFERENT THINGS: 0
4. TROUBLE RELAXING: 0
1. FEELING NERVOUS, ANXIOUS, OR ON EDGE: 0
6. BECOMING EASILY ANNOYED OR IRRITABLE: 0
7. FEELING AFRAID AS IF SOMETHING AWFUL MIGHT HAPPEN: 0
IF YOU CHECKED OFF ANY PROBLEMS ON THIS QUESTIONNAIRE, HOW DIFFICULT HAVE THESE PROBLEMS MADE IT FOR YOU TO DO YOUR WORK, TAKE CARE OF THINGS AT HOME, OR GET ALONG WITH OTHER PEOPLE: NOT DIFFICULT AT ALL
GAD7 TOTAL SCORE: 0
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
## 2022-11-08 LAB
EST. AVERAGE GLUCOSE BLD GHB EST-MCNC: 100 MG/DL
HBA1C MFR BLD: 5.1 % (ref 4.8–5.6)

## 2022-11-09 LAB
HCV AB S/CO SERPL IA: <0.1 S/CO RATIO (ref 0–0.9)
HCV AB SERPL QL IA: NORMAL

## 2023-03-20 RX ORDER — GABAPENTIN 300 MG/1
CAPSULE ORAL
Qty: 90 CAPSULE | Refills: 2 | OUTPATIENT
Start: 2023-03-20

## 2023-10-31 LAB — MAMMOGRAPHY, EXTERNAL: NORMAL

## 2023-11-08 ENCOUNTER — TELEPHONE (OUTPATIENT)
Dept: INTERNAL MEDICINE CLINIC | Facility: CLINIC | Age: 58
End: 2023-11-08

## 2023-11-08 NOTE — TELEPHONE ENCOUNTER
No showed appointment 11/8/2023    I have called the patient this afternoon to see if she wanted to reschedule her appointment     Number was not in service     No show letter #1 sent today

## (undated) DEVICE — DERMABOND SKIN ADH 0.7ML -- DERMABOND ADVANCED 12/BX

## (undated) DEVICE — SOLUTION IV 1000ML 0.9% SOD CHL

## (undated) DEVICE — SUTURE NONABSORBABLE MONOFILAMENT 4-0 PS-2 18 IN BLU PROLENE 8682H

## (undated) DEVICE — STERILE HOOK LOCK LATEX FREE ELASTIC BANDAGE 2INX5YD: Brand: HOOK LOCK™

## (undated) DEVICE — SURGICAL PROCEDURE PACK BASIC ST FRANCIS

## (undated) DEVICE — BANDAGE COMPR 9 FTX4 IN SMOOTH COMFORTABLE SYNTH ESMRK LF

## (undated) DEVICE — KENDALL RADIOLUCENT FOAM MONITORING ELECTRODE RECTANGULAR SHAPE: Brand: KENDALL

## (undated) DEVICE — SYRINGE EAR 2OZ ULC SLIMMER TIP FLAT BTM SUCT PWR DISP FOR

## (undated) DEVICE — (D)PREP SKN CHLRAPRP APPL 26ML -- CONVERT TO ITEM 371833

## (undated) DEVICE — DRAPE,HAND,STERILE: Brand: MEDLINE

## (undated) DEVICE — PADDING CAST W2INXL4YD ST COT COHESIVE HND TEARABLE SPEC

## (undated) DEVICE — AMD ANTIMICROBIAL GAUZE SPONGES,12 PLY USP TYPE VII, 0.2% POLYHEXAMETHYLENE BIGUANIDE HCI (PHMB): Brand: CURITY

## (undated) DEVICE — DRAPE, FILM SHEET, 44X65 STERILE: Brand: MEDLINE

## (undated) DEVICE — ZIMMER® STERILE DISPOSABLE TOURNIQUET CUFF WITH PLC, DUAL PORT, SINGLE BLADDER, 18 IN. (46 CM)

## (undated) DEVICE — CANNULA NSL ORAL AD FOR CAPNOFLEX CO2 O2 AIRLFE

## (undated) DEVICE — AIRLIFE™ OXYGEN TUBING 7 FEET (2.1 M) CRUSH RESISTANT OXYGEN TUBING, VINYL TIPPED: Brand: AIRLIFE™

## (undated) DEVICE — CONNECTOR TBNG OD5-7MM O2 END DISP

## (undated) DEVICE — NEEDLE HYPO 25GA L1.5IN BLU POLYPR HUB S STL REG BVL STR

## (undated) DEVICE — RETROGRADE KNIFE BOX OF 6: Brand: ECTRA

## (undated) DEVICE — (D)SYR 10ML 1/5ML GRAD NSAF -- PKGING CHANGE USE ITEM 338027